# Patient Record
Sex: FEMALE | Race: WHITE | NOT HISPANIC OR LATINO | Employment: OTHER | URBAN - METROPOLITAN AREA
[De-identification: names, ages, dates, MRNs, and addresses within clinical notes are randomized per-mention and may not be internally consistent; named-entity substitution may affect disease eponyms.]

---

## 2017-02-24 LAB
ABO + RH BLD: NORMAL
HBV SURFACE AG SERPL QL IA: NORMAL
RUBELLA ANTIBODY IGG QUANTITATIVE: NORMAL IU/ML

## 2017-09-22 LAB — GROUP B STREP PCR: NORMAL

## 2017-10-10 ENCOUNTER — ANESTHESIA (OUTPATIENT)
Dept: OBGYN | Facility: CLINIC | Age: 32
End: 2017-10-10
Payer: COMMERCIAL

## 2017-10-10 ENCOUNTER — ANESTHESIA EVENT (OUTPATIENT)
Dept: OBGYN | Facility: CLINIC | Age: 32
End: 2017-10-10
Payer: COMMERCIAL

## 2017-10-10 ENCOUNTER — HOSPITAL ENCOUNTER (INPATIENT)
Facility: CLINIC | Age: 32
LOS: 2 days | Discharge: HOME | End: 2017-10-12
Attending: OBSTETRICS & GYNECOLOGY | Admitting: OBSTETRICS & GYNECOLOGY
Payer: COMMERCIAL

## 2017-10-10 PROBLEM — Z36.89 ENCOUNTER FOR TRIAGE IN PREGNANT PATIENT: Status: ACTIVE | Noted: 2017-10-10

## 2017-10-10 LAB
ABO + RH BLD: NORMAL
ABO + RH BLD: NORMAL
BLD GP AB SCN SERPL QL: NORMAL
BLOOD BANK CMNT PATIENT-IMP: NORMAL
SPECIMEN EXP DATE BLD: NORMAL

## 2017-10-10 PROCEDURE — 86780 TREPONEMA PALLIDUM: CPT | Performed by: OBSTETRICS & GYNECOLOGY

## 2017-10-10 PROCEDURE — 25000132 ZZH RX MED GY IP 250 OP 250 PS 637: Performed by: OBSTETRICS & GYNECOLOGY

## 2017-10-10 PROCEDURE — 86850 RBC ANTIBODY SCREEN: CPT | Performed by: OBSTETRICS & GYNECOLOGY

## 2017-10-10 PROCEDURE — 86901 BLOOD TYPING SEROLOGIC RH(D): CPT | Performed by: OBSTETRICS & GYNECOLOGY

## 2017-10-10 PROCEDURE — 3E0R3BZ INTRODUCTION OF ANESTHETIC AGENT INTO SPINAL CANAL, PERCUTANEOUS APPROACH: ICD-10-PCS | Performed by: ANESTHESIOLOGY

## 2017-10-10 PROCEDURE — 25000125 ZZHC RX 250: Performed by: OBSTETRICS & GYNECOLOGY

## 2017-10-10 PROCEDURE — 25000128 H RX IP 250 OP 636: Performed by: ANESTHESIOLOGY

## 2017-10-10 PROCEDURE — 12000037 ZZH R&B POSTPARTUM INTERMEDIATE

## 2017-10-10 PROCEDURE — 99215 OFFICE O/P EST HI 40 MIN: CPT

## 2017-10-10 PROCEDURE — 25000125 ZZHC RX 250: Performed by: ANESTHESIOLOGY

## 2017-10-10 PROCEDURE — 10907ZC DRAINAGE OF AMNIOTIC FLUID, THERAPEUTIC FROM PRODUCTS OF CONCEPTION, VIA NATURAL OR ARTIFICIAL OPENING: ICD-10-PCS | Performed by: OBSTETRICS & GYNECOLOGY

## 2017-10-10 PROCEDURE — 25000128 H RX IP 250 OP 636: Performed by: OBSTETRICS & GYNECOLOGY

## 2017-10-10 PROCEDURE — 36415 COLL VENOUS BLD VENIPUNCTURE: CPT | Performed by: OBSTETRICS & GYNECOLOGY

## 2017-10-10 PROCEDURE — 37000011 ZZH ANESTHESIA WARD SERVICE

## 2017-10-10 PROCEDURE — S0020 INJECTION, BUPIVICAINE HYDRO: HCPCS | Performed by: ANESTHESIOLOGY

## 2017-10-10 PROCEDURE — 72200001 ZZH LABOR CARE VAGINAL DELIVERY SINGLE

## 2017-10-10 PROCEDURE — 86900 BLOOD TYPING SEROLOGIC ABO: CPT | Performed by: OBSTETRICS & GYNECOLOGY

## 2017-10-10 RX ORDER — EPHEDRINE SULFATE 50 MG/ML
5 INJECTION, SOLUTION INTRAMUSCULAR; INTRAVENOUS; SUBCUTANEOUS
Status: DISCONTINUED | OUTPATIENT
Start: 2017-10-10 | End: 2017-10-10

## 2017-10-10 RX ORDER — CARBOPROST TROMETHAMINE 250 UG/ML
250 INJECTION, SOLUTION INTRAMUSCULAR
Status: DISCONTINUED | OUTPATIENT
Start: 2017-10-10 | End: 2017-10-10

## 2017-10-10 RX ORDER — LIDOCAINE 40 MG/G
CREAM TOPICAL
Status: DISCONTINUED | OUTPATIENT
Start: 2017-10-10 | End: 2017-10-10

## 2017-10-10 RX ORDER — IBUPROFEN 400 MG/1
400-800 TABLET, FILM COATED ORAL EVERY 6 HOURS PRN
Status: DISCONTINUED | OUTPATIENT
Start: 2017-10-10 | End: 2017-10-12 | Stop reason: HOSPADM

## 2017-10-10 RX ORDER — OXYTOCIN 10 [USP'U]/ML
10 INJECTION, SOLUTION INTRAMUSCULAR; INTRAVENOUS
Status: DISCONTINUED | OUTPATIENT
Start: 2017-10-10 | End: 2017-10-10

## 2017-10-10 RX ORDER — NALOXONE HYDROCHLORIDE 0.4 MG/ML
.1-.4 INJECTION, SOLUTION INTRAMUSCULAR; INTRAVENOUS; SUBCUTANEOUS
Status: DISCONTINUED | OUTPATIENT
Start: 2017-10-10 | End: 2017-10-12 | Stop reason: HOSPADM

## 2017-10-10 RX ORDER — NALBUPHINE HYDROCHLORIDE 10 MG/ML
2.5-5 INJECTION, SOLUTION INTRAMUSCULAR; INTRAVENOUS; SUBCUTANEOUS EVERY 6 HOURS PRN
Status: DISCONTINUED | OUTPATIENT
Start: 2017-10-10 | End: 2017-10-10

## 2017-10-10 RX ORDER — OXYTOCIN 10 [USP'U]/ML
10 INJECTION, SOLUTION INTRAMUSCULAR; INTRAVENOUS
Status: DISCONTINUED | OUTPATIENT
Start: 2017-10-10 | End: 2017-10-12 | Stop reason: HOSPADM

## 2017-10-10 RX ORDER — FENTANYL CITRATE 50 UG/ML
25 INJECTION, SOLUTION INTRAMUSCULAR; INTRAVENOUS ONCE
Status: COMPLETED | OUTPATIENT
Start: 2017-10-10 | End: 2017-10-10

## 2017-10-10 RX ORDER — AMOXICILLIN 250 MG
1-2 CAPSULE ORAL 2 TIMES DAILY
Status: DISCONTINUED | OUTPATIENT
Start: 2017-10-10 | End: 2017-10-12 | Stop reason: HOSPADM

## 2017-10-10 RX ORDER — SODIUM CHLORIDE, SODIUM LACTATE, POTASSIUM CHLORIDE, CALCIUM CHLORIDE 600; 310; 30; 20 MG/100ML; MG/100ML; MG/100ML; MG/100ML
INJECTION, SOLUTION INTRAVENOUS CONTINUOUS
Status: DISCONTINUED | OUTPATIENT
Start: 2017-10-10 | End: 2017-10-10

## 2017-10-10 RX ORDER — OXYCODONE AND ACETAMINOPHEN 5; 325 MG/1; MG/1
1 TABLET ORAL
Status: DISCONTINUED | OUTPATIENT
Start: 2017-10-10 | End: 2017-10-10

## 2017-10-10 RX ORDER — HYDROMORPHONE HYDROCHLORIDE 1 MG/ML
.3-.5 INJECTION, SOLUTION INTRAMUSCULAR; INTRAVENOUS; SUBCUTANEOUS EVERY 30 MIN PRN
Status: DISCONTINUED | OUTPATIENT
Start: 2017-10-10 | End: 2017-10-12 | Stop reason: HOSPADM

## 2017-10-10 RX ORDER — BUPIVACAINE HYDROCHLORIDE 2.5 MG/ML
1.5 INJECTION, SOLUTION EPIDURAL; INFILTRATION; INTRACAUDAL ONCE
Status: COMPLETED | OUTPATIENT
Start: 2017-10-10 | End: 2017-10-10

## 2017-10-10 RX ORDER — IBUPROFEN 400 MG/1
800 TABLET, FILM COATED ORAL
Status: DISCONTINUED | OUTPATIENT
Start: 2017-10-10 | End: 2017-10-10

## 2017-10-10 RX ORDER — OXYTOCIN/0.9 % SODIUM CHLORIDE 30/500 ML
340 PLASTIC BAG, INJECTION (ML) INTRAVENOUS CONTINUOUS PRN
Status: DISCONTINUED | OUTPATIENT
Start: 2017-10-10 | End: 2017-10-12 | Stop reason: HOSPADM

## 2017-10-10 RX ORDER — BISACODYL 10 MG
10 SUPPOSITORY, RECTAL RECTAL DAILY PRN
Status: DISCONTINUED | OUTPATIENT
Start: 2017-10-12 | End: 2017-10-12 | Stop reason: HOSPADM

## 2017-10-10 RX ORDER — OXYCODONE HYDROCHLORIDE 5 MG/1
5-10 TABLET ORAL
Status: DISCONTINUED | OUTPATIENT
Start: 2017-10-10 | End: 2017-10-12 | Stop reason: HOSPADM

## 2017-10-10 RX ORDER — ACETAMINOPHEN 325 MG/1
650 TABLET ORAL EVERY 4 HOURS PRN
Status: DISCONTINUED | OUTPATIENT
Start: 2017-10-10 | End: 2017-10-10

## 2017-10-10 RX ORDER — MISOPROSTOL 200 UG/1
400 TABLET ORAL
Status: DISCONTINUED | OUTPATIENT
Start: 2017-10-10 | End: 2017-10-12 | Stop reason: HOSPADM

## 2017-10-10 RX ORDER — NALOXONE HYDROCHLORIDE 0.4 MG/ML
.1-.4 INJECTION, SOLUTION INTRAMUSCULAR; INTRAVENOUS; SUBCUTANEOUS
Status: DISCONTINUED | OUTPATIENT
Start: 2017-10-10 | End: 2017-10-10

## 2017-10-10 RX ORDER — ONDANSETRON 2 MG/ML
4 INJECTION INTRAMUSCULAR; INTRAVENOUS EVERY 6 HOURS PRN
Status: DISCONTINUED | OUTPATIENT
Start: 2017-10-10 | End: 2017-10-10

## 2017-10-10 RX ORDER — HYDROCORTISONE 2.5 %
CREAM (GRAM) TOPICAL 3 TIMES DAILY PRN
Status: DISCONTINUED | OUTPATIENT
Start: 2017-10-10 | End: 2017-10-12 | Stop reason: HOSPADM

## 2017-10-10 RX ORDER — METHYLERGONOVINE MALEATE 0.2 MG/ML
200 INJECTION INTRAVENOUS
Status: DISCONTINUED | OUTPATIENT
Start: 2017-10-10 | End: 2017-10-10

## 2017-10-10 RX ORDER — METHYLERGONOVINE MALEATE 0.2 MG/ML
200 INJECTION INTRAVENOUS
Status: DISCONTINUED | OUTPATIENT
Start: 2017-10-10 | End: 2017-10-12 | Stop reason: HOSPADM

## 2017-10-10 RX ORDER — OXYTOCIN/0.9 % SODIUM CHLORIDE 30/500 ML
100-340 PLASTIC BAG, INJECTION (ML) INTRAVENOUS CONTINUOUS PRN
Status: DISCONTINUED | OUTPATIENT
Start: 2017-10-10 | End: 2017-10-11

## 2017-10-10 RX ORDER — OXYTOCIN/0.9 % SODIUM CHLORIDE 30/500 ML
100 PLASTIC BAG, INJECTION (ML) INTRAVENOUS CONTINUOUS
Status: DISCONTINUED | OUTPATIENT
Start: 2017-10-10 | End: 2017-10-12 | Stop reason: HOSPADM

## 2017-10-10 RX ORDER — ACETAMINOPHEN 325 MG/1
650 TABLET ORAL EVERY 4 HOURS PRN
Status: DISCONTINUED | OUTPATIENT
Start: 2017-10-10 | End: 2017-10-12 | Stop reason: HOSPADM

## 2017-10-10 RX ORDER — LANOLIN 100 %
OINTMENT (GRAM) TOPICAL
Status: DISCONTINUED | OUTPATIENT
Start: 2017-10-10 | End: 2017-10-12 | Stop reason: HOSPADM

## 2017-10-10 RX ADMIN — IBUPROFEN 800 MG: 400 TABLET ORAL at 21:56

## 2017-10-10 RX ADMIN — SODIUM CHLORIDE, POTASSIUM CHLORIDE, SODIUM LACTATE AND CALCIUM CHLORIDE 1000 ML: 600; 310; 30; 20 INJECTION, SOLUTION INTRAVENOUS at 19:03

## 2017-10-10 RX ADMIN — BUPIVACAINE HYDROCHLORIDE 1.5 ML: 2.5 INJECTION, SOLUTION EPIDURAL; INFILTRATION; INTRACAUDAL at 19:33

## 2017-10-10 RX ADMIN — ACETAMINOPHEN 650 MG: 325 TABLET, FILM COATED ORAL at 21:56

## 2017-10-10 RX ADMIN — Medication 340 ML/HR: at 20:51

## 2017-10-10 RX ADMIN — FENTANYL CITRATE 25 MCG: 50 INJECTION, SOLUTION INTRAMUSCULAR; INTRAVENOUS at 19:33

## 2017-10-10 NOTE — IP AVS SNAPSHOT
MRN:[id number]                      After Visit Summary   10/10/2017    Maya Dave    MRN: [id number]           Thank you!     Thank you for choosing [facility] for your care. Our goal is always to provide you with excellent care. Hearing back from our patients is one way we can continue to improve our services. Please take a few minutes to complete the written survey that you may receive in the mail after you visit with us. Thank you!        Patient Information     Date Of Birth          1985        About your hospital stay     You were admitted on:  October 10, 2017 You last received care in the:  [address]    You were discharged on:  October 12, 2017        Reason for your hospital stay       Maternity care                  Who to Call     For medical emergencies, please call 911.  For non-urgent questions about your medical care, please call your primary care provider or clinic, None          Attending Provider     Provider Specialty    Pascual Hdez MD OB/Gyn    Nola Villegas,  OB/Gyn       Primary Care Provider    None Specified      After Care Instructions     Activity       Review discharge instructions            Diet       Resume previous diet            Discharge Instructions - Gestational diabetic patients       Gestational diabetic patients to follow up for fasting blood sugar and 2 hour 75gm glucose load at 6 weeks postpartum.            Discharge Instructions - Hypertensive disorders patients       High Blood pressure patients to follow up in clinic or via home care within one week for a blood pressure check            Discharge Instructions - Postpartum visit       Schedule postpartum visit with your provider and return to clinic in 6 weeks.                  Further instructions from your care team       Postpartum Vaginal Delivery Instructions    Activity       Ask family and friends for help when you need it.    Do not  place anything in your vagina for 6 weeks.    You are not restricted on other activities, but take it easy for a few weeks to allow your body to recover from delivery.  You are able to do any activities you feel up to that point.    No driving until you have stopped taking your pain medications (usually two weeks after delivery).     Call your health care provider if you have any of these symptoms:       Increased pain, swelling, redness, or fluid around your stiches from an episiotomy or perineal tear.    A fever above 100.4 F (38 C) with or without chills when placing a thermometer under your tongue.    You soak a sanitary pad with blood within 1 hour, or you see blood clots larger than a golf ball.    Bleeding that lasts more than 6 weeks.    Vaginal discharge that smells bad.    Severe pain, cramping or tenderness in your lower belly area.    A need to urinate more frequently (use the toilet more often), more urgently (use the toilet very quickly), or it burns when you urinate.    Nausea and vomiting.    Redness, swelling or pain around a vein in your leg.    Problems breastfeeding or a red or painful area on your breast.    Chest pain and cough or are gasping for air.    Problems coping with sadness, anxiety, or depression.  If you have any concerns about hurting yourself or the baby, call your provider immediately.     You have questions or concerns after you return home.     Keep your hands clean:  Always wash your hands before touching your perineal area and stitches.  This helps reduce your risk of infection.  If your hands aren't dirty, you may use an alcohol hand-rub to clean your hands. Keep your nails clean and short.        Pending Results     No orders found from 10/8/2017 to 10/11/2017.            Statement of Approval     Ordered          10/11/17 1406  I have reviewed and agree with all the recommendations and orders detailed in this document.  EFFECTIVE NOW     Approved and electronically signed  "by:  Nola Villegas DO             Admission Information     Date & Time Provider Department Dept. Phone    10/10/2017 Nola Villegas DO [address] [phone]      Your Vitals Were     Blood Pressure Pulse Temperature Respirations Height Weight    126/65 57 98.5  F (36.9  C) (Oral) 16 1.702 m (5' 7\") 71.2 kg (157 lb)    Pulse Oximetry BMI (Body Mass Index)                99% 24.59 kg/m2          [employer] Information     [employer] lets you send messages to your doctor, view your test results, renew your prescriptions, schedule appointments and more. To sign up, go to [email or web address] . Click on \"Log in\" on the left side of the screen, which will take you to the Welcome page. Then click on \"Sign up Now\" on the right side of the page.     You will be asked to enter the access code listed below, as well as some personal information. Please follow the directions to create your username and password.     Your access code is: [email or web address]  Expires: 1/10/2018  8:33 AM     Your access code will  in 90 days. If you need help or a new code, please call your [facility] clinic or [phone].        Care EveryWhere ID     This is your Care EveryWhere ID. This could be used by other organizations to access your [facility] medical records  [id number]        Equal Access to Services     [facility] campusJUAN AH: Hadii cruzito ho hadasho Soomaali, waaxda luqadaha, qaybta kaalmada adeegyada, olena summers . So [facility] [phone].    ATENCIÓN: Si habla español, tiene a maddox disposición servicios gratuitos de asistencia lingüística. Llame al [phone].    We comply with applicable federal civil rights laws and [place] laws. We do not discriminate on the basis of race, color, national origin, age, disability, sex, sexual orientation, or gender identity.               Review of your medicines      UNREVIEWED medicines. Ask your doctor about these medicines        " Dose / Directions    magnesium chloride 535 (64 MG) MG Tbcr CR tablet        Dose:  535 mg   Take 535 mg by mouth daily   Refills:  0       PRENATAL VITAMIN PO        Dose:  1 tablet   Take 1 tablet by mouth daily.   Refills:  0                Protect others around you: Learn how to safely use, store and throw away your medicines at www.disposemymeds.org.             Medication List: This is a list of all your medications and when to take them. Check marks below indicate your daily home schedule. Keep this list as a reference.      Medications           Morning Afternoon Evening Bedtime As Needed    magnesium chloride 535 (64 MG) MG Tbcr CR tablet   Take 535 mg by mouth daily                                PRENATAL VITAMIN PO   Take 1 tablet by mouth daily.

## 2017-10-10 NOTE — IP AVS SNAPSHOT
[address]    [place] [address]    Phone:  [phone]                                       After Visit Summary   10/10/2017    Maya Dave    MRN: [id number]           After Visit Summary Signature Page     I have received my discharge instructions, and my questions have been answered. I have discussed any challenges I see with this plan with the nurse or doctor.    ..........................................................................................................................................  Patient/Patient Representative Signature      ..........................................................................................................................................  Patient Representative Print Name and Relationship to Patient    ..................................................               ................................................  Date                                            Time    ..........................................................................................................................................  Reviewed by Signature/Title    ...................................................              ..............................................  Date                                                            Time

## 2017-10-11 LAB — T PALLIDUM IGG+IGM SER QL: NEGATIVE

## 2017-10-11 PROCEDURE — 25000132 ZZH RX MED GY IP 250 OP 250 PS 637: Performed by: OBSTETRICS & GYNECOLOGY

## 2017-10-11 PROCEDURE — 12000037 ZZH R&B POSTPARTUM INTERMEDIATE

## 2017-10-11 RX ADMIN — SENNOSIDES AND DOCUSATE SODIUM 1 TABLET: 8.6; 5 TABLET ORAL at 20:14

## 2017-10-11 RX ADMIN — SENNOSIDES AND DOCUSATE SODIUM 1 TABLET: 8.6; 5 TABLET ORAL at 12:44

## 2017-10-11 RX ADMIN — IBUPROFEN 800 MG: 400 TABLET ORAL at 09:11

## 2017-10-11 RX ADMIN — ACETAMINOPHEN 650 MG: 325 TABLET, FILM COATED ORAL at 12:44

## 2017-10-11 RX ADMIN — ACETAMINOPHEN 650 MG: 325 TABLET, FILM COATED ORAL at 05:49

## 2017-10-11 RX ADMIN — IBUPROFEN 800 MG: 400 TABLET ORAL at 15:24

## 2017-10-11 RX ADMIN — ACETAMINOPHEN 650 MG: 325 TABLET, FILM COATED ORAL at 01:25

## 2017-10-11 RX ADMIN — IBUPROFEN 800 MG: 400 TABLET ORAL at 03:27

## 2017-10-11 RX ADMIN — ACETAMINOPHEN 650 MG: 325 TABLET, FILM COATED ORAL at 20:14

## 2017-10-11 RX ADMIN — ACETAMINOPHEN 650 MG: 325 TABLET, FILM COATED ORAL at 09:11

## 2017-10-11 NOTE — ANESTHESIA PREPROCEDURE EVALUATION
Anesthesia Evaluation     . Pt has had prior anesthetic.     No history of anesthetic complications          ROS/MED HX    ENT/Pulmonary:       Neurologic:       Cardiovascular:         METS/Exercise Tolerance:     Hematologic:         Musculoskeletal:         GI/Hepatic:         Renal/Genitourinary:         Endo:         Psychiatric:         Infectious Disease:         Malignancy:         Other:                                    Anesthesia Plan      History & Physical Review  History and physical reviewed and following examination; no interval change.    ASA Status:  2 .        Plan for Spinal     Healthy multip, 3rd delivery, 7cm with history of fast labor.  Requesting ITN      Postoperative Care      Consents  Plan/risks discussed with: family declined risks.                        .

## 2017-10-11 NOTE — PLAN OF CARE
Data: Maya Dave transferred to [facility] via wheelchair at 2310. Baby transferred via parent's arms.  Action: Receiving unit notified of transfer: Yes. Patient and family notified of room change. Report given to Du at 2320. Belongings sent to receiving unit. Accompanied by Registered Nurse. Oriented patient to surroundings. Call light within reach. ID bands double-checked with receiving RN.  Response: Patient tolerated transfer and is stable.

## 2017-10-11 NOTE — H&P
No significant change in general health status based on examination of the patient, review of Nursing Admission Database and prenatal record.    Madonna Craig

## 2017-10-11 NOTE — L&D DELIVERY NOTE
DELIVERY SUMMARY:  Date: 10/10/2017     HISTORY:  Maya Dave is a 32 year old  at 38w6d gestation. Prenatal course uncomplicated. GBS negative.  She is Rh positive and Rubella immune.      FIRST STAGE:  She presented to labor and delivery with active labor.  Amniotic fluid was noted to be clear at the time of amniotomy.  She progressed to complete.  ITN analgesia.    SECOND STAGE:   Fetal heart tones were reassuring during the second stage of labor.  Head delivered OA over intact perineum.  No nuchal cord. Shoulders were delivered without difficulty and the remainder of the body followed.  The male infant was placed directly on the maternal abdomen and the infant was bulb suctioned.  Cord clamping was delayed 60 seconds after which the cord was clamped and cut.  Cord blood was obtained.  IV Pitocin was given through running IV.  Apgar 8 at 1 minute and 9 at 5 minutes.  Weight pending.    THIRD STAGE:  Pitocin was administered after delivery of the infant.  The placenta delivered spontaneously with gentle cord traction.  No lacerations. The uterus was noted to be firm.  Sponge and needle counts were correct.  Quantitated blood loss was 325 cc.  Mom and baby doing well and stable to transfer to postpartum recovery.    Madonna Craig  Obstetrics, Gynecology, and Infertility

## 2017-10-11 NOTE — PLAN OF CARE
-writer assumes care of patient   -MDA at bedside for ITN, patient sitting for procedure. Bupivacaine and fentanyl handed off  193-LT  9020-7231 PD down to 80s that returned to baseline after AROM and repositioning  -IVF bolus  0O2 placed 15L  -Dr. Craig at bedside  -SVE 6/80 AROM clear fluid  -RL  -patient to LL SVE 9  -SVE 10. Patient set up to start pushing  -Patient started pushing. RN and MD at bedside continously for pushing to monitor fht and patient progress.   - male apgars 8/9  -Placenta delivered intact

## 2017-10-11 NOTE — ANESTHESIA PROCEDURE NOTES
Peripheral nerve/Neuraxial procedure note : intrathecal  Pre-Procedure  Performed by ANTHONY CARR  Location: OB      Pre-Anesthestic Checklist: patient identified, IV checked, risks and benefits discussed, informed consent, pre-op evaluation and at physician/surgeon's request    Timeout  Correct Patient: Yes   Correct Procedure: Yes       Correct Position: Yes     .   Procedure Documentation    .    Procedure:    Intrathecal.  Insertion Site:L3-4  (midline approach)      Patient Prep;mask, sterile gloves, povidone-iodine 7.5% surgical scrub, patient draped.  .  Needle: Spinal Needle (gauge): 24 Spinal/LP Needle Length (inches): 3.5 # of attempts: 1 and # of redirects:  Introducer used .       Assessment/Narrative  Paresthesias: No.  .  .  clear CSF fluid removed . Comments:  Christelle-Kilo needle

## 2017-10-11 NOTE — PLAN OF CARE
Problem: Patient Care Overview  Goal: Plan of Care/Patient Progress Review  Outcome: Improving  Data: Vital signs within normal limits. Postpartum checks within normal limits - see flow record. Patient eating and drinking normally. Patient able to empty bladder independently and is up ambulating. No apparent signs of infection. Patient performing self cares and is able to care for infant.  Action: Patient medicated during the shift for cramping. See MAR. Patient reassessed within 1 hour after each medication and pain was improved - patient stated she was comfortable. Patient education done about first 24 hour cares. See flow record.  Response: Positive attachment behaviors observed with infant. Support persons are present.   Plan: Anticipate discharge on 10/12/14.

## 2017-10-11 NOTE — LACTATION NOTE
Initial Lactation visit. Hand out given. Recommend unlimited, frequent breast feedings: At least 8 - 12 times every 24 hours. Avoid pacifiers and supplementation with formula unless medically indicated. Explained benefits of holding baby skin on skin to help promote better breastfeeding outcomes. Will revisit as needed.    Citlali Kim RN IBCLC

## 2017-10-11 NOTE — PLAN OF CARE
Patient presents to Maternal assessment center with complaints of contractions which began at 1630 and became increasingly painful.  Has a history of fast labors with her other two babies.  Is grimacing and having trouble controlling breathing through contractions.    1820:  Monitors applied with patient's consent.  She states her cervix was 3 cms dilated in the office yesterday.  Positive fetal movement, denies bleeding or leaking of fluid.  Vital signs obtained and history taken.      Cervical exam with patient's consent at 1830.  4.5 cms dilated, 90 percent effacement noted and -1 station.  Patient reports that she was examined yesterday and that she was vertex.      Call placed to Dr. Hdez, on call for Dr. Villegas.  He is in surgery at [facility] and Dr. Craig will be backup until he is available.  1847:  Patient transferred to room 220

## 2017-10-11 NOTE — PLAN OF CARE
Pt. admitted from L&D  via wheelchair and transferred to bed with standby assist. Pt. arrived with baby and was accompanied by spouse and RN and arrived with personal belongings. Report was taken from Mary Davis RN in L&D. VSS. Fundus is firm and midline.  Vaginal bleeding is small.  Pitocin infusing per policy.  Pt. oriented to the room and call light system.

## 2017-10-11 NOTE — PLAN OF CARE
Problem: Patient Care Overview  Goal: Plan of Care/Patient Progress Review  Outcome: Improving  VSS. Pain well controlled with tylenol, ibuprofen, and heating pad. Up independently in room. Independent with breastfeeding  and  cares. On pathway. Continue to monitor and notify MD as needed.

## 2017-10-11 NOTE — PROGRESS NOTES
"[facility] Obstetrics Postpartum Progress Note  10/11/2017     S: Pt doing well. Pain is well controlled. Bleeding Moderate. Infant is being . Voiding spontaneously.    O:  /65  Pulse 75  Temp 98  F (36.7  C) (Oral)  Resp 16  Ht 1.702 m (5' 7\")  Wt 71.2 kg (157 lb)  SpO2 99%  Breastfeeding? Unknown  BMI 24.59 kg/m2   Gen: healthy, alert and no distress    Resp: nonlabored breathing  Abd: soft, nondistended, appropriately TTP, FF at u  Ext: non-tender, no edema    No results found for: HGB  Lab Results   Component Value Date    ABO O 10/10/2017    RH Pos 10/10/2017    AS Neg 10/10/2017    RUBELLAABIGG 52 2012       A: 32 year old  PPD#1 s/p    P:   Routine postpartum cares.    Ambulation encouraged  Breast feeding strategies discussed  Monitor wound for signs of infection  Pain control measures as needed  Reportable signs and symptoms dicussed with the patient  Anticipate discharge tomorrow      Nola Villegas   Obstetrics, Gynecology, and Infertility        "

## 2017-10-12 VITALS
OXYGEN SATURATION: 99 % | HEIGHT: 67 IN | RESPIRATION RATE: 16 BRPM | SYSTOLIC BLOOD PRESSURE: 126 MMHG | HEART RATE: 57 BPM | BODY MASS INDEX: 24.64 KG/M2 | DIASTOLIC BLOOD PRESSURE: 65 MMHG | TEMPERATURE: 98.5 F | WEIGHT: 157 LBS

## 2017-10-12 PROCEDURE — 25000132 ZZH RX MED GY IP 250 OP 250 PS 637: Performed by: OBSTETRICS & GYNECOLOGY

## 2017-10-12 RX ADMIN — ACETAMINOPHEN 650 MG: 325 TABLET, FILM COATED ORAL at 00:00

## 2017-10-12 RX ADMIN — SENNOSIDES AND DOCUSATE SODIUM 2 TABLET: 8.6; 5 TABLET ORAL at 08:12

## 2017-10-12 RX ADMIN — IBUPROFEN 800 MG: 400 TABLET ORAL at 06:29

## 2017-10-12 RX ADMIN — ACETAMINOPHEN 650 MG: 325 TABLET, FILM COATED ORAL at 06:29

## 2017-10-12 RX ADMIN — IBUPROFEN 800 MG: 400 TABLET ORAL at 00:01

## 2017-10-12 NOTE — PLAN OF CARE
Problem: Patient Care Overview  Goal: Plan of Care/Patient Progress Review  Outcome: Adequate for Discharge Date Met:  10/12/17  Vitals stable. Up ad ramon well. Voiding without difficulty. Breastfeeding going well. Pain controlled with tylenol and ibuprofen. Anticipate discharge home today with infant.

## 2017-10-12 NOTE — PLAN OF CARE
Mom and baby discharge instructions discussed with pt and spouse who verbalize understanding. Pt states she does not need breast pump. Mom and baby ID bands matched. Security alarms removed. Mom and baby discharge to home.

## 2017-10-12 NOTE — PLAN OF CARE
Problem: Patient Care Overview  Goal: Plan of Care/Patient Progress Review  Outcome: Improving  Vital signs are stable. Postpartum checks within normal limits.  Patient eating and drinking normally. Patient able to empty bladder independently and is up ambulating. No apparent signs of infection. Patient performing self cares and is able to care for infant. Positive attachment behaviors observed with infant. Breastfeeding is going well.  Support persons are present. D/C order has been put in for tomorrow.  Will continue to monitor.

## 2017-10-12 NOTE — PLAN OF CARE
Problem: Patient Care Overview  Goal: Plan of Care/Patient Progress Review  Outcome: Improving  Vital signs stable. Patient voiding without difficulty. Able to ambulate in room free of dizziness. Taking tylenol/ibuprofen for pain management. Working on breastfeeding infant every 2-3 hours. Encouraged to call with questions/concerns. Will continue to monitor.

## 2017-10-12 NOTE — ANESTHESIA POSTPROCEDURE EVALUATION
Patient: Maya Dave    * No procedures listed *    Diagnosis:* No pre-op diagnosis entered *  Diagnosis Additional Information: No value filed.    Anesthesia Type:  Spinal    Note:  Anesthesia Post Evaluation    Patient location during evaluation: Floor  Patient participation: Able to fully participate in evaluation  Level of consciousness: awake  Pain management: adequate  Airway patency: patent  Cardiovascular status: acceptable  Respiratory status: acceptable  Hydration status: acceptable  PONV: none     Anesthetic complications: None    Comments: ITN no complications        Last vitals:  Vitals:    10/11/17 0545 10/11/17 0800 10/11/17 1532   BP: 119/43 116/65 104/61   Pulse:   59   Resp: 16 16 16   Temp: 36.6  C (97.8  F) 36.7  C (98  F) 36.9  C (98.4  F)   SpO2:            Electronically Signed By: Isiah Olivera MD  October 11, 2017  9:12 PM

## 2019-02-21 ENCOUNTER — TRANSFERRED RECORDS (OUTPATIENT)
Dept: HEALTH INFORMATION MANAGEMENT | Facility: CLINIC | Age: 34
End: 2019-02-21

## 2019-02-26 DIAGNOSIS — O21.1 HYPEREMESIS GRAVIDARUM WITH DEHYDRATION: ICD-10-CM

## 2019-02-26 RX ORDER — DEXTROSE, SODIUM CHLORIDE, SODIUM LACTATE, POTASSIUM CHLORIDE, AND CALCIUM CHLORIDE 5; .6; .31; .03; .02 G/100ML; G/100ML; G/100ML; G/100ML; G/100ML
INJECTION, SOLUTION INTRAVENOUS CONTINUOUS
Status: CANCELLED
Start: 2019-02-27

## 2019-02-26 RX ORDER — ONDANSETRON 2 MG/ML
4 INJECTION INTRAMUSCULAR; INTRAVENOUS ONCE
Status: CANCELLED
Start: 2019-02-27 | End: 2019-02-27

## 2019-02-27 ENCOUNTER — INFUSION THERAPY VISIT (OUTPATIENT)
Dept: INFUSION THERAPY | Facility: CLINIC | Age: 34
End: 2019-02-27
Attending: OBSTETRICS & GYNECOLOGY
Payer: COMMERCIAL

## 2019-02-27 VITALS — SYSTOLIC BLOOD PRESSURE: 100 MMHG | DIASTOLIC BLOOD PRESSURE: 67 MMHG | HEART RATE: 87 BPM

## 2019-02-27 DIAGNOSIS — O21.1 HYPEREMESIS GRAVIDARUM WITH DEHYDRATION: Primary | ICD-10-CM

## 2019-02-27 PROCEDURE — 96360 HYDRATION IV INFUSION INIT: CPT

## 2019-02-27 PROCEDURE — 25000128 H RX IP 250 OP 636: Performed by: OBSTETRICS & GYNECOLOGY

## 2019-02-27 PROCEDURE — 25800030 ZZH RX IP 258 OP 636: Performed by: OBSTETRICS & GYNECOLOGY

## 2019-02-27 PROCEDURE — 96361 HYDRATE IV INFUSION ADD-ON: CPT

## 2019-02-27 RX ORDER — DEXTROSE, SODIUM CHLORIDE, SODIUM LACTATE, POTASSIUM CHLORIDE, AND CALCIUM CHLORIDE 5; .6; .31; .03; .02 G/100ML; G/100ML; G/100ML; G/100ML; G/100ML
INJECTION, SOLUTION INTRAVENOUS CONTINUOUS
Status: CANCELLED
Start: 2019-02-27

## 2019-02-27 RX ORDER — DEXTROSE, SODIUM CHLORIDE, SODIUM LACTATE, POTASSIUM CHLORIDE, AND CALCIUM CHLORIDE 5; .6; .31; .03; .02 G/100ML; G/100ML; G/100ML; G/100ML; G/100ML
INJECTION, SOLUTION INTRAVENOUS CONTINUOUS
Status: DISCONTINUED | OUTPATIENT
Start: 2019-02-27 | End: 2019-02-27 | Stop reason: HOSPADM

## 2019-02-27 RX ORDER — ONDANSETRON 2 MG/ML
4 INJECTION INTRAMUSCULAR; INTRAVENOUS ONCE
Status: CANCELLED
Start: 2019-02-27 | End: 2019-02-27

## 2019-02-27 RX ADMIN — SODIUM CHLORIDE, SODIUM LACTATE, POTASSIUM CHLORIDE, CALCIUM CHLORIDE AND DEXTROSE MONOHYDRATE: 5; 600; 310; 30; 20 INJECTION, SOLUTION INTRAVENOUS at 10:08

## 2019-02-27 RX ADMIN — SODIUM CHLORIDE, POTASSIUM CHLORIDE, SODIUM LACTATE AND CALCIUM CHLORIDE 1000 ML: 600; 310; 30; 20 INJECTION, SOLUTION INTRAVENOUS at 11:08

## 2019-02-27 NOTE — PROGRESS NOTES
"Infusion Nursing Note:  Maya Dave presents today for IVF.    Patient seen by provider today: No   present during visit today: Not Applicable.    Note: Pt requests to not receive Zofran today.  She thinks she had this in the past and it causes \"skin crawling sensations and anxiousness.\"  Will only receive IVF today.  Has f/u with her OB tomorrow and will discuss other anti-emetic options for infusion and at home. Currently uses B6 and Unisom, which she doesn't feel helps with her nausea during the day but it does help her to sleep at night.    Intravenous Access:  Peripheral IV placed.    Treatment Conditions:  Not Applicable.      Post Infusion Assessment:  Patient tolerated infusion without incident.  Site patent and intact, free from redness, edema or discomfort.  No evidence of extravasations.  Access discontinued per protocol.    Discharge Plan:   Discharge instructions reviewed with: Patient.  Patient and/or family verbalized understanding of discharge instructions and all questions answered.  Patient will return PRN for next appointment.   Patient discharged in stable condition accompanied by: self.  Departure Mode: Ambulatory.    Nay Morgan RN                        "

## 2019-03-20 LAB
ABO + RH BLD: NORMAL
ABO + RH BLD: NORMAL
BLD GP AB SCN SERPL QL: NORMAL
HBV SURFACE AG SERPL QL IA: NORMAL
HIV 1+2 AB+HIV1 P24 AG SERPL QL IA: NORMAL
RUBELLA ANTIBODY IGG QUANTITATIVE: NORMAL IU/ML
TREPONEMA ANTIBODIES: NON REACTIVE

## 2019-03-22 ENCOUNTER — PRE VISIT (OUTPATIENT)
Dept: MATERNAL FETAL MEDICINE | Facility: CLINIC | Age: 34
End: 2019-03-22

## 2019-04-10 RX ORDER — DEXTROSE, SODIUM CHLORIDE, SODIUM LACTATE, POTASSIUM CHLORIDE, AND CALCIUM CHLORIDE 5; .6; .31; .03; .02 G/100ML; G/100ML; G/100ML; G/100ML; G/100ML
INJECTION, SOLUTION INTRAVENOUS ONCE
Status: CANCELLED
Start: 2019-04-10

## 2019-05-01 ENCOUNTER — OFFICE VISIT (OUTPATIENT)
Dept: MATERNAL FETAL MEDICINE | Facility: CLINIC | Age: 34
End: 2019-05-01
Attending: OBSTETRICS & GYNECOLOGY
Payer: COMMERCIAL

## 2019-05-01 ENCOUNTER — HOSPITAL ENCOUNTER (OUTPATIENT)
Dept: LAB | Facility: CLINIC | Age: 34
Discharge: HOME | End: 2019-05-01
Attending: OBSTETRICS & GYNECOLOGY | Admitting: OBSTETRICS & GYNECOLOGY
Payer: COMMERCIAL

## 2019-05-01 DIAGNOSIS — J95.89 ACUTE RESPIRATORY INSUFFICIENCY, POSTOPERATIVE: ICD-10-CM

## 2019-05-01 DIAGNOSIS — O09.522 SUPERVISION OF ELDERLY MULTIGRAVIDA IN SECOND TRIMESTER: Primary | ICD-10-CM

## 2019-05-01 DIAGNOSIS — O09.522 SUPERVISION OF ELDERLY MULTIGRAVIDA IN SECOND TRIMESTER: ICD-10-CM

## 2019-05-01 PROCEDURE — 40000791 ZZHCL STATISTIC VERIFI PRENATAL TRISOMY 21,18,13: Performed by: OBSTETRICS & GYNECOLOGY

## 2019-05-01 PROCEDURE — 96040 ZZH GENETIC COUNSELING, EACH 30 MINUTES: CPT | Mod: ZF | Performed by: GENETIC COUNSELOR, MS

## 2019-05-01 PROCEDURE — 36415 COLL VENOUS BLD VENIPUNCTURE: CPT | Performed by: OBSTETRICS & GYNECOLOGY

## 2019-05-01 NOTE — PROGRESS NOTES
[facility]  Genetic Counseling Consult    Patient: Maya Dave YOB: 1985   Date of Service: 19      Maya Dave was seen at [facility] [place] for genetic consultation to discuss the options for screening and testing for fetal chromosome abnormalities.  The indication for genetic counseling is advanced maternal age.        Impression/Plan:   1.  Maya had a blood draw for NIPT (Innatal test through [facility]).  Results are expected within 7-10 days, and will be available in [employer].  We will contact her to discuss the results, and a copy will be forwarded to the office of the referring OB provider. Maya Dave provided verbal permission for results to be left on her voicemail at [phone]. She requested the results do not include the sex unless the sex chromosome aneuploidy results are abnormal.    2.  Maternal serum AFP (single marker screen) is recommended after 15 weeks to screen for open neural tube defects. A quad screen should not be performed.    3.  An 18-20 week comprehensive ultrasound is standard of care for all women 35 or older at delivery.    Pregnancy History:   /Parity:    Age at Delivery: 34 year old  RORY: 10/17/2019, by Ultrasound  Gestational Age: 15w6d    No significant complications or exposures were reported in the current pregnancy.    Kasey is no longer taking prenatal vitamins because they make her nauseous. I encouraged her to discuss this with her primary provider who maybe able to offer an alternative such as gummy vitamins      Maya coulter pregnancy history is significant for three full term pregnancies without complications.    She does report experiencing migraines during pregnancy that typically last a couple of days    Medical History:   Maya coulter reported medical history is not expected to impact pregnancy management or risks to fetal development.        Family History:   A three-generation pedigree was obtained, and is scanned under the  Media  tab.   The following significant findings were reported by Maya:    The father of the pregnancy, Jeffrey, 35, is healthy    Kasey and Jeffrey have two daughters (5.5, 3.5 years of age) and one son (1.5 years of age). All are healthy      Kasey's mother  at 49 from a heart attack due to an aortic dissection. She reports that her mother was very healthy before the aneurysm. The remaining family history is negative for aortic dissection or any other related complications. About 20% of thoracic aortic aneurysms and dissections are due to a genetic condition called familial thoracic aortic aneurysm and dissection (ATTILA). The remaining 80% are most likely due to non-inherited factors such as injury or disease. With the remaining family history negative for aneurysms and dissections and no other symptoms related to these conditions (hypermobiligy, hernia, stoke, etc), an inherited factor is unlikely but possible. I encouraged Kasey to discuss this family history with her primary provider and monitor the health of her mother's siblings for more information.      Kasey has two maternal aunts both diagnosed with breast cancer in their mid 50s.   We discussed the family history of breast cancer briefly. Cancer most often occurs by chance, however some families seem to develop cancer more frequently than expected. Everyone has a risk to develop cancer, but individuals may be at an increased risk to develop cancer based on their family history. Genetic counseling is available for cancer syndromes. Cancer family history, even without genetic testing, can change cancer screening recommendations for family members and aid in insurance coverage for access to them as well. The most informative individuals to complete cancer genetic counseling and genetic testing are those with a personal history of cancer or those closely related to the  "affected individuals. This would be one of Kasey's maternal aunts.    If the family wants more information they can contact the [facility] Cancer Risk Management Program ([phone]). Physicians can also make referrals at [email or web address] or, if within the [place] system, through [facility] referral for \"Cancer Risk Mgmt/Cancer Genetic Counseling\"     Indications to consider the program include a personal or family history of:   Breast cancer before age 50   Multiple close relatives with breast cancer  Triple negative breast cancer at any age  Ovarian cancer at any age  Male breast cancer  Ashkenazi Adventism ancestry and breast, pancreatic, ovarian cancer, or prostate cancer with a Nidhi score of 7+ at any age    Otherwise, the reported family history is negative for multiple miscarriages, stillbirths, birth defects, mental retardation, known genetic conditions, and consanguinity.       Carrier Screening:   The patient reports that she and the father of the pregnancy have  ancestry:      Cystic fibrosis is an autosomal recessive genetic condition that occurs with increased frequency in individuals of  ancestry and carrier screening for this condition is available.  In addition,  screening in the [facility] includes cystic fibrosis.      Expanded carrier screening for mutations in a large panel of genes associated with autosomal recessive conditions including cystic fibrosis, spinal muscular atrophy, and others, is now available.      Carrier screening was not discussed today.       Risk Assessment for Chromosome Conditions:   We explained that the risk for fetal chromosome abnormalities increases with maternal age. We discussed specific features of common chromosome abnormalities, including Down syndrome, trisomy 13, trisomy 18, and sex chromosome trisomies.      At age 34 at midtrimester, the risk to have a baby with Down " syndrome is 1 in 274.     At age 34 at midtrimester, the risk to have a baby with any chromosome abnormality is 1 in 135.     Testing Options:   We discussed the following options:   Non-invasive Prenatal Testing (NIPT)    Maternal plasma cell-free DNA testing; first trimester ultrasound with nuchal translucency and nasal bone assessment is recommended, when appropriate    Screens for fetal trisomy 21, trisomy 13, trisomy 18, and sex chromosome aneuploidy    Cannot screen for open neural tube defects; maternal serum AFP after 15 weeks is recommended     Chorionic villus sampling (CVS)    Invasive procedure typically performed in the first trimester by which placental villi are obtained for the purpose of chromosome analysis and/or other prenatal genetic analysis    Diagnostic results; >99% sensitivity for fetal chromosome abnormalities    Cannot test for open neural tube defects; maternal serum AFP after 15 weeks is recommended     Genetic Amniocentesis    Invasive procedure typically performed in the second trimester by which amniotic fluid is obtained for the purpose of chromosome analysis and/or other prenatal genetic analysis    Diagnostic results; >99% sensitivity for fetal chromosome abnormalities    AFAFP measurement tests for open neural tube defects     Comprehensive (Level II) ultrasound: Detailed ultrasound performed between 18-22 weeks gestation to screen for major birth defects and  markers for  aneuploidy.    We reviewed the benefits and limitations of this testing.  Screening tests provide a risk assessment specific to the pregnancy for certain fetal chromosome abnormalities, but cannot definitively diagnose or exclude a fetal chromosome abnormality.  Follow-up genetic counseling and consideration of diagnostic testing is recommended with any abnormal screening result.     Diagnostic tests carry inherent risks- including risk of miscarriage- that require careful consideration.  These tests can detect  fetal chromosome abnormalities with greater than 99% certainty.  Results can be compromised by maternal cell contamination or mosaicism, and are limited by the resolution of cytogenetic G-banding technology.  There is no screening nor diagnostic test that can detect all forms of birth defects or mental disability.     It was a pleasure to be involved with Maya coulter care. Face-to-face time of the meeting was 45 minutes.    Padmini Winter MS, [facility]  Licensed Genetic Counselor   [facility]   Maternal Fetal Medicine Centers  [email or web address]   Office: [phone] MF: [phone]  Pager: [phone] Fax: [phone]

## 2019-05-06 ENCOUNTER — TELEPHONE (OUTPATIENT)
Dept: MATERNAL FETAL MEDICINE | Facility: CLINIC | Age: 34
End: 2019-05-06

## 2019-05-06 NOTE — TELEPHONE ENCOUNTER
Called and discussed normal NIPT results with Maya. Results indicate NO ANEUPLOIDY DETECTED for chromosomes 21, 18, 13, or sex chromosomes (XX) Sex was NOT disclosed per patient's wishes.     This puts her current pregnancy at low risk for Down syndrome, trisomy 18, trisomy 13 and sex chromosome abnormalities. This test is reported to have the following sensitivities: Down syndrome- 99%, trisomy 18- 98%, and trisomy 13- 98%. Although these results are reassuring, this does not replace a standard chromosome analysis from a chorionic villus sampling or amniocentesis.     Level II ultrasound is recommended, given AMA.     MSAFP is the appropriate second trimester screening test for open neural tube defects; the maternal quad screen is not recommended.    Her results were faxed to her primary OB provider for review.    Padmini Winter MS, [facility]  Licensed Genetic Counselor   [facility]   Maternal Fetal Medicine Centers  [email or web address]   Office: [phone] MFM: [phone]  Pager: [phone] Fax: [phone]

## 2019-05-07 LAB — LAB SCANNED RESULT: NORMAL

## 2019-05-24 ENCOUNTER — HOSPITAL ENCOUNTER (OUTPATIENT)
Dept: LAB | Facility: CLINIC | Age: 34
Discharge: HOME | End: 2019-05-24
Attending: OBSTETRICS & GYNECOLOGY | Admitting: OBSTETRICS & GYNECOLOGY
Payer: COMMERCIAL

## 2019-05-24 ENCOUNTER — HOSPITAL ENCOUNTER (OUTPATIENT)
Dept: ULTRASOUND IMAGING | Facility: CLINIC | Age: 34
Discharge: HOME | End: 2019-05-24
Attending: OBSTETRICS & GYNECOLOGY | Admitting: OBSTETRICS & GYNECOLOGY
Payer: COMMERCIAL

## 2019-05-24 ENCOUNTER — OFFICE VISIT (OUTPATIENT)
Dept: MATERNAL FETAL MEDICINE | Facility: CLINIC | Age: 34
End: 2019-05-24
Attending: OBSTETRICS & GYNECOLOGY
Payer: COMMERCIAL

## 2019-05-24 DIAGNOSIS — J95.89 ACUTE RESPIRATORY INSUFFICIENCY, POSTOPERATIVE: ICD-10-CM

## 2019-05-24 DIAGNOSIS — O28.3 ABNORMAL PRENATAL ULTRASOUND: ICD-10-CM

## 2019-05-24 DIAGNOSIS — O35.9XX0 SUSPECTED FETAL ANOMALY, ANTEPARTUM, SINGLE OR UNSPECIFIED FETUS: ICD-10-CM

## 2019-05-24 DIAGNOSIS — O28.3 ABNORMAL PRENATAL ULTRASOUND: Primary | ICD-10-CM

## 2019-05-24 DIAGNOSIS — O43.112 CIRCUMVALLATE PLACENTA DURING PREGNANCY IN SECOND TRIMESTER, ANTEPARTUM: ICD-10-CM

## 2019-05-24 DIAGNOSIS — O09.522 ELDERLY MULTIGRAVIDA IN SECOND TRIMESTER: Primary | ICD-10-CM

## 2019-05-24 PROCEDURE — 36415 COLL VENOUS BLD VENIPUNCTURE: CPT | Performed by: OBSTETRICS & GYNECOLOGY

## 2019-05-24 PROCEDURE — 40000072 ZZH STATISTIC GENETIC COUNSELING, < 16 MIN: Mod: ZF | Performed by: GENETIC COUNSELOR, MS

## 2019-05-24 PROCEDURE — 40000791 ZZHCL STATISTIC VERIFI PRENATAL TRISOMY 21,18,13: Performed by: OBSTETRICS & GYNECOLOGY

## 2019-05-24 PROCEDURE — 76811 OB US DETAILED SNGL FETUS: CPT

## 2019-05-24 NOTE — PROGRESS NOTES
[facility]  Genetic Counseling Consult    Patient:  Maya Dave YOB: 1985   Date of Service:  19      Maya Dave was seen at the [facility] for genetic consultation as part of her appointment for comprehensive ultrasound.  The indication for genetic counseling is right sided aortic arch on ultrasound. She was accompanied by her .       Impression/Plan:   I met with Kasey earlier in the pregnancy (2019) and she had normal NIPT results. Today I met with her at the request of Dr. 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    1. Maya had a cell-free fetal DNA test earlier in pregnancy, which was normal.    2. Maya had a comprehensive (level II) ultrasound today.  Please see the ultrasound report for further details.    3. The patient had a blood draw for NIPT (Innatal with microdeletions test through [facility]).  Results are expected within 7-10 days, and will be available in [place].  We will contact her to discuss the results, and a copy will be forwarded to the office of the referring OB provider.    Pregnancy History:   /Parity:    Age at Delivery: 34 year old  RORY: 10/17/2019, by Ultrasound  Gestational Age: 19w1d    Maya s pregnancy history is significant for three full term pregnancies without complications.    She does report experiencing migraines during pregnancy that typically last a couple of days  Medical History:   Maya coulter reported medical history is not expected to impact pregnancy management or risks to fetal development.       Family History:   A three-generation pedigree was obtained on 2019, and is scanned under the  Media  tab. Please see that note for more details.     Carrier Screening:   Carrier screening was not discussed today.       Risk Assessment for Chromosome Conditions:   We explained that the risk for fetal  chromosome abnormalities increases with maternal age. We discussed specific features of common chromosome abnormalities, including Down syndrome, trisomy 13, trisomy 18, and sex chromosome trisomies.      At age 34 at midtrimester, the risk to have a baby with Down syndrome is 1 in 274.     At age 34 at midtrimester, the risk to have a baby with any chromosome abnormality is 1 in 135.       Maya had maternal serum screening earlier in pregnancy.     Non-invasive Prenatal Testing (NIPT)    Maternal plasma cell-free DNA testing    Screens for fetal trisomy 21, trisomy 13, trisomy 18, and sex chromosome aneuploidy    First trimester ultrasound with nuchal translucency and nasal bone assessment was not performed in this pregnancy, to our knowledge.    Maya had a Innatal test earlier in pregnancy; we reviewed the results today, which are normal for chromosome 13, chromosome 18 and chromosome 21 (no aneuploidy detected)    Given the accuracy of this test, these results greatly decrease the chance for certain fetal chromosome abnormalities    We discussed the limitations of normal NIPT results    MSAFP (after 15 weeks for open neural tube defect screening) results were not available for our review today.         Testing Options:   We discussed the following options:   Non-invasive Prenatal Testing (NIPT)    Maternal plasma cell-free DNA testing; first trimester ultrasound with nuchal translucency and nasal bone assessment is recommended, when appropriate    Screens for fetal trisomy 21, trisomy 13, trisomy 18, and sex chromosome aneuploidy    Cannot screen for open neural tube defects; maternal serum AFP after 15 weeks is recommended       Genetic Amniocentesis    Invasive procedure typically performed in the second trimester by which amniotic fluid is obtained for the purpose of chromosome analysis and/or other prenatal genetic analysis    Diagnostic results; >99% sensitivity for fetal chromosome  abnormalities    AFAFP measurement tests for open neural tube defects       Comprehensive (Level II) ultrasound: Detailed ultrasound performed between 18-22 weeks gestation to screen for major birth defects and markers for aneuploidy.    We discussed the right sided aortic arch could be a benign variant or could indicate an increased chance of a heart defect. Certain heart defects are more common in a condition such as 22q11.2 deletion syndrome. Kasey will be having an fetal echocardiogram on 06/05 and that may provide some additional information on the likelihood of such a condition. Dr. Peterson did offer the option of amniocentesis but discussed this could be delayed until after the echocardiogram provides more information. Today Kasey decided to do the NIPT with added microdeletions which includes 22q11.2 deletion syndrome. We discussed this testing is less accurate than NIPT for conditions like Down syndrome. The lab has provided numbers for specificity and sensitivity but they are based on small studies. Therefore, it is difficult to determine what the positive or negative predictive value is. If the results are normal it is reassuring, especially if the fetal echocardiogram is otherwise normal. If the results are abnormal, this would increase the chance of the condition, but not diagnosis. An abnormal fetal echocardiogram could support an abnormal NIPT result. Kasey thought it would be helpful to get some information from a non-invasive route. We reviewed there may be a significant unknown chance for a false positive or negative results. Kasey seemed to have a good understanding of this information and wished to proceed.    22q11.2 deletion syndrome is also referred to as DiGeorge syndrome and velo-cardio-facial syndrome.  This genetic condition involves a deletion of a small region of DNA on chromosome 22.  This condition is extremely variable in how it presents in affected individuals, but some symptoms  include: congenital heart defects, with conotruncal defects such as tetralogy of fallot being most common, cleft palate, learning difficulties, immune system deficiencies, renal anomalies, and hearing loss.  Individuals with this genetic condition may have some, many, or very few symptoms, and there are individuals who have this condition but are unaware of it because of a mild presentation. Depending on the results we will talk more about 22q11.2 deletion during our result discussion.          We reviewed the benefits and limitations of this testing.  Screening tests provide a risk assessment specific to the pregnancy for certain fetal chromosome abnormalities, but cannot definitively diagnose or exclude a fetal chromosome abnormality.  Follow-up genetic counseling and consideration of diagnostic testing is recommended with any abnormal screening result.     Diagnostic tests carry inherent risks- including risk of miscarriage- that require careful consideration.  These tests can detect fetal chromosome abnormalities with greater than 99% certainty.  Results can be compromised by maternal cell contamination or mosaicism, and are limited by the resolution of cytogenetic G-banding technology.  There is no screening nor diagnostic test that can detect all forms of birth defects or mental disability.    It was a pleasure to be involved with Maya coulter janelle. Face-to-face time of the meeting was 15 minutes.      Padmini Winter MS, [facility]  Licensed Genetic Counselor   [facility]   Maternal Fetal Medicine Centers  [email or web address]   Office: [phone] [facility]: [phone]  Pager: [phone] Fax: [phone]

## 2019-05-24 NOTE — PROGRESS NOTES
"Please see \"Imaging\" tab under \"Chart Review\" for details of today's visit.    Ricki Peterson    "

## 2019-05-28 ENCOUNTER — TELEPHONE (OUTPATIENT)
Dept: MATERNAL FETAL MEDICINE | Facility: CLINIC | Age: 34
End: 2019-05-28

## 2019-05-28 DIAGNOSIS — O28.3 ABNORMAL FETAL ULTRASOUND: Primary | ICD-10-CM

## 2019-05-28 NOTE — TELEPHONE ENCOUNTER
Kasey called to discuss her pregnancy plan. 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 At that time, Kasey choose to do NIPT with microdeletion. Those results are still pending. Kasey and her  have decided to pursue an amniocentesis. However, Kasey would like to gain as much information before the making the final decision on the amniocentesis. Kasey explained that she and her  would not terminate a pregnancy and would like testing for information purposes only.     If the pending NIPT with microdeletions result is positive for 22q11.2 deletion syndrome Kasey believes she would want the amniocentesis as soon as possible. However, if the result is negative, Kasey would like to do the amniocentesis sometime after her fetal echocardiogram on 06/05.     I will work with the schedulers to check availability and will call Kasey back either today or tomorrow with a plan.     Padmini Winter MS, [facility]  Licensed Genetic Counselor   [facility]   Maternal Fetal Medicine Centers  [email or web address]   Office: [phone] MF: [phone]  Pager: [phone] Fax: [phone]

## 2019-05-29 ENCOUNTER — TELEPHONE (OUTPATIENT)
Dept: MATERNAL FETAL MEDICINE | Facility: CLINIC | Age: 34
End: 2019-05-29

## 2019-05-29 NOTE — TELEPHONE ENCOUNTER
Left brief message for Kasey explaining that I had two available appointment times on June 7 to schedule an amniocentesis. We discussed her decision to have an amniocentesis the day prior. I encouraged her to call me back to discuss these options.     Padmini Winter MS, [facility]  Licensed Genetic Counselor   [facility]   Maternal Fetal Medicine Centers  [email or web address]   Office: [phone] MFM: [phone]  Pager: [phone] Fax: [phone]

## 2019-05-31 ENCOUNTER — TELEPHONE (OUTPATIENT)
Dept: MATERNAL FETAL MEDICINE | Facility: CLINIC | Age: 34
End: 2019-05-31

## 2019-05-31 NOTE — TELEPHONE ENCOUNTER
Called Maya and discussed normal NIPT results. NIPT results indicate NO ANEUPLOIDY DETECTED for chromosomes 21, 18, 13, or sex chromosomes (XX). Sex was NOT disclosed.    This puts her current pregnancy at low risk for Down syndrome, trisomy 18, trisomy 13 and sex chromosome abnormalities. This test is reported to have the following sensitivities: Down syndrome- 99%, trisomy 18- 98%, and trisomy 13- 98%.     The microdeletion analysis portion of her NIPT testing indicated NO MICRODELETIONS DETECTED for chromosome 22q11.2, 15q11.2, 1p36, 4p- and 5p-.  Although these results are reassuring, this does not replace a standard chromosome analysis or CGH array from an amniocentesis.     A copy of her results were faxed to her primary OB.    Kasey has her fetal echocardiogram on June 5. She will also meet with a genetic counselor that day and has an amniocentesis scheduled. She is planning to decide on the amniocentesis depending on the echocardiogram results.    Padmini Winter MS, [facility]  Licensed Genetic Counselor   [facility]   Maternal Fetal Medicine Centers  [email or web address]   Office: [phone] MFM: [phone]  Pager: [phone] Fax: [phone]

## 2019-06-05 ENCOUNTER — HOSPITAL ENCOUNTER (OUTPATIENT)
Dept: CARDIOLOGY | Facility: CLINIC | Age: 34
Discharge: HOME | End: 2019-06-05
Attending: OBSTETRICS & GYNECOLOGY | Admitting: OBSTETRICS & GYNECOLOGY
Payer: COMMERCIAL

## 2019-06-05 ENCOUNTER — OFFICE VISIT (OUTPATIENT)
Dept: MATERNAL FETAL MEDICINE | Facility: CLINIC | Age: 34
End: 2019-06-05
Attending: OBSTETRICS & GYNECOLOGY
Payer: COMMERCIAL

## 2019-06-05 ENCOUNTER — HOSPITAL ENCOUNTER (OUTPATIENT)
Dept: ULTRASOUND IMAGING | Facility: CLINIC | Age: 34
End: 2019-06-05
Attending: OBSTETRICS & GYNECOLOGY
Payer: COMMERCIAL

## 2019-06-05 DIAGNOSIS — O09.522 SUPERVISION OF ELDERLY MULTIGRAVIDA IN SECOND TRIMESTER: ICD-10-CM

## 2019-06-05 DIAGNOSIS — O35.9XX0 SUSPECTED FETAL ANOMALY, ANTEPARTUM, SINGLE OR UNSPECIFIED FETUS: ICD-10-CM

## 2019-06-05 DIAGNOSIS — O28.3 ABNORMAL FETAL ULTRASOUND: Primary | ICD-10-CM

## 2019-06-05 DIAGNOSIS — O28.3 ABNORMAL FETAL ULTRASOUND: ICD-10-CM

## 2019-06-05 LAB — MATERNAL CELL CONTAMINATION MOL ANALYSIS: NORMAL

## 2019-06-05 PROCEDURE — 81229 CYTOG ALYS CHRML ABNR SNPCGH: CPT | Performed by: OBSTETRICS & GYNECOLOGY

## 2019-06-05 PROCEDURE — 88285 CHROMOSOME COUNT ADDITIONAL: CPT | Performed by: OBSTETRICS & GYNECOLOGY

## 2019-06-05 PROCEDURE — 81265 STR MARKERS SPECIMEN ANAL: CPT | Performed by: OBSTETRICS & GYNECOLOGY

## 2019-06-05 PROCEDURE — 76815 OB US LIMITED FETUS(S): CPT

## 2019-06-05 PROCEDURE — 88280 CHROMOSOME KARYOTYPE STUDY: CPT | Performed by: OBSTETRICS & GYNECOLOGY

## 2019-06-05 PROCEDURE — 36415 COLL VENOUS BLD VENIPUNCTURE: CPT | Mod: ZF

## 2019-06-05 PROCEDURE — 76825 ECHO EXAM OF FETAL HEART: CPT

## 2019-06-05 PROCEDURE — 88269 CHROMOSOME ANALYS AMNIOTIC: CPT | Performed by: OBSTETRICS & GYNECOLOGY

## 2019-06-05 PROCEDURE — 59000 AMNIOCENTESIS DIAGNOSTIC: CPT | Performed by: OBSTETRICS & GYNECOLOGY

## 2019-06-05 PROCEDURE — 82106 ALPHA-FETOPROTEIN AMNIOTIC: CPT | Performed by: OBSTETRICS & GYNECOLOGY

## 2019-06-05 PROCEDURE — 88235 TISSUE CULTURE PLACENTA: CPT | Performed by: OBSTETRICS & GYNECOLOGY

## 2019-06-05 PROCEDURE — 40001082 ZZHCL STATISTIC MATERNAL CELL CONTAM MOLEC: Performed by: OBSTETRICS & GYNECOLOGY

## 2019-06-05 PROCEDURE — 96040 ZZH GENETIC COUNSELING, EACH 30 MINUTES: CPT | Mod: ZF | Performed by: GENETIC COUNSELOR, MS

## 2019-06-05 NOTE — PROGRESS NOTES
Please refer to ultrasound report under 'Imaging' Studies of 'Chart Review' tabs.    Keshav Tucker M.D.

## 2019-06-05 NOTE — PROGRESS NOTES
[facility] [place]  Genetic Counseling Consult    Patient:  Maya Dave YOB: 1985   Date of Service:  6/05/19      Maya Dave was seen at the [facility] [place] for genetic consultation as part of her appointment for amniocentesis.The indication for genetic counseling was discussion and consent for amniocentesis. She was accompanied by her .        Impression/Plan:   1. Maya had a cell-free fetal DNA test earlier in pregnancy, which was normal. 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 We discussed that while these results are reassuring it does not eliminate the screened conditions and there is insufficient data available to estimate the negative predictive value.     2. Maya had a fetal echocardiogram today which confirmed the right sided aortic arch but did not find other cardiac anomalies. Please see the report for further details. We met again after the echocardiogram to discuss the option of amniocentesis. We did discuss the individuals with 22q11.2 deletion syndrome can be born with a range of cardiac anomalies from none at all to more than a right sided aortic arch. This means some individuals with 22q11.2 deletion syndrome have been born with only a right sided aortic arch. Dr. Tucker also met with them briefly before to discuss more details regarding the procedure. Maya decided that she ultimately wanted to know the information an amniocentesis would provide and decided to elect for the procedure.     3. The patient had an ultrasound and amniocentesis. The following testing was ordered on the prenatal sample: Chromosome analysis, AFAFP and microarray.  Results are expected within 7-10 days, and will be available in [facility].  We will contact her to  discuss the results, and a copy will be forwarded to the office of the referring OB provider.    Pregnancy History:   /Parity:    Age at Delivery: 34 year old  RORY: 10/17/2019, by Ultrasound  Gestational Age: 20w6d    Maya coulter pregnancy history is significant for three full term pregnancies without complications.    Medical History:   Maya coulter reported medical history is not expected to impact pregnancy management or risks to fetal development.       Family History:   A three-generation pedigree was obtained on 2019, and is scanned under the  Media  tab. Please see that note for more details.     Carrier Screening:   Carrier screening was not discussed today.       Risk Assessment for Chromosome Conditions:   We explained that the risk for fetal chromosome abnormalities increases with maternal age. We discussed specific features of common chromosome abnormalities, including Down syndrome, trisomy 13, trisomy 18, and sex chromosome trisomies.      At age 34 at midtrimester, the risk to have a baby with Down syndrome is 1 in 274.     At age 34 at midtrimester, the risk to have a baby with any chromosome abnormality is 1 in 135.       Maya had maternal serum screening earlier in pregnancy.    Non-invasive Prenatal Testing (NIPT)    Maternal plasma cell-free DNA testing    Screens for fetal trisomy 21, trisomy 13, trisomy 18, and sex chromosome aneuploidy    First trimester ultrasound with nuchal translucency and nasal bone assessment was not performed in this pregnancy, to our knowledge.    Maya had a Innatal test earlier in pregnancy; we reviewed the results today, which are normal for chromosome 13, chromosome 18 and chromosome 21 (no aneuploidy detected). She also had the added microdeletions which were negative.     Given the accuracy of this test, these results greatly decrease the chance for certain fetal chromosome abnormalities    We discussed the limitations of normal NIPT  results    MSAFP (after 15 weeks for open neural tube defect screening) results were not available for our review today.         Testing Options:    The benefits, risks, and limitations of amniocentesis were reviewed in detail. Maya would like to proceed with amniocentesis today.      Genetic Amniocentesis  - This is an invasive procedure typically performed at 15 weeks or later, through which amniotic fluid is obtained for the purpose of chromosome analysis and/or other prenatal genetic analysis.  - Amniocentesis is considered a diagnostic test for chromosome problems during pregnancy.  - The risk of pregnancy loss associated with amniocentesis is generally estimated to be 1/500 or less.  - Amniotic fluid AFP measurement is also done to screen for the possibility of open neural tube or ventral defects.     We reviewed the amniocentesis procedure consent form as well as the genetic testing consent form. All questions were answered to their apparent satisfaction. The following testing was ordered on the prenatal sample: Chromosome analysis, AFAFP, and genomic microarray (aCGH) via [facility] laboratory. Results of the chromosome analysis are expected within 7-10 days and microarray in 7-10 days. AFAFP results typically take 1-4 days. These results will be available in [facility].  We will contact her to discuss the results, and a copy will be forwarded to the office of the referring OB provider.     We reviewed the benefits and limitations of this testing.  Screening tests provide a risk assessment specific to the pregnancy for certain fetal chromosome abnormalities, but cannot definitively diagnose or exclude a fetal chromosome abnormality.  Follow-up genetic counseling and consideration of diagnostic testing is recommended with any abnormal screening result.     Diagnostic tests carry inherent risks- including risk of miscarriage- that require careful consideration.  These tests can detect fetal chromosome abnormalities  with greater than 99% certainty.  Results can be compromised by maternal cell contamination or mosaicism, and are limited by the resolution of cytogenetic G-banding technology.  There is no screening nor diagnostic test that can detect all forms of birth defects or mental disability.    It was a pleasure to be involved with Maya coulter care. Face-to-face time of the meeting was 30 minutes.      Padmini Winter MS, [facility]  Licensed Genetic Counselor   [facility]  [email or web address]   Office: [phone] MF: [phone]  Pager: [phone] Fax: [phone]

## 2019-06-05 NOTE — NURSING NOTE
Pt here for amniocentesis d/t fetal cardiac anomaly. Saw [facility], see their dictation.  After consent signed and TimeOut completed, Dr. Tucker withdrew adequate fluid x1 transabdominal pass.    Patient reports no pain.  Pt is RH positive.  MD reviewed blood type and screen and Rhogam not indicated. Discharge teaching completed and questions answered.  Pt discharged ambulatory and stable.   Lab alerted by calling phone number on amnio work-aid for [facility] site.  Cytogenetics notified of specimen.  Specimen transported to main lab, warm hand-off completed.  Gardenia Flowers RN

## 2019-06-06 LAB — LAB SCANNED RESULT: NORMAL

## 2019-06-07 ENCOUNTER — TELEPHONE (OUTPATIENT)
Dept: MATERNAL FETAL MEDICINE | Facility: CLINIC | Age: 34
End: 2019-06-07

## 2019-06-07 LAB
A-FETO PROTEIN  AMNGEST AGE ULTRA: NORMAL
AFP AMN-MCNC: 4723 NG/ML
AFP INTERP AMN-IMP: NEGATIVE
AFP MOM AMN: 0.87
GA: NORMAL WK
LMP START DATE: NORMAL

## 2019-06-07 NOTE — TELEPHONE ENCOUNTER
Left message for Kasey with normal amniotic fluid AFP levels. The AFAFP is a standard test we add after an amniocentesis to confirm there is no concerns for an open neural tube defect. These results are normal. I will likely connect with Kasey next week to discuss the microarray results.    Padmini Winter MS, [facility]  Licensed Genetic Counselor   [facility]   [facility]  [email or web address]   Office: [phone] MF: [phone]  Pager: [phone] Fax: [phone]

## 2019-06-09 LAB
CHROM ANALY RESULT (ISCN): NORMAL
PATHOLOGY STUDY: NORMAL
SERVICE CMNT-IMP: YES
SPECIMEN SOURCE: NORMAL

## 2019-06-10 ENCOUNTER — TELEPHONE (OUTPATIENT)
Dept: MATERNAL FETAL MEDICINE | Facility: CLINIC | Age: 34
End: 2019-06-10

## 2019-06-10 NOTE — TELEPHONE ENCOUNTER
Called Maya to discuss the normal microarray result from her amniocentesis. She pursued the amniocentesis for right-sided aortic arch as the concern for 22q11.2 deletion syndrome was mentioned. The microarray result was normal with no clinically significant abnormalities.     NORMAL MICROARRAY RESULT   Genetic results   ISCN: arr(1-22,X)x2 (hg19) (normal female result)   The cytogenomic microarray analysis indicated no clinically   significant abnormalities and is consistent with a female   chromosome complement.     Sex was NOT disclosed. However, I did mentioned that these results confirmed the sex chromosome results of the non-invasive prenatal screening results.    Kasey was excited to hear this news. We discussed there is still the possibility of single gene disorder but there are not indications to prompt testing for those. This supports the right-sided aortic arch being an isolated finding but I encouraged Kasey to continue following all recommendations for ultrasound and echocardiogram after birth. Kasey will return for her next follow up ultrasound on July 5.     We are still awaiting final chromosome (karyotype) results which will likely be normal, based on the normal microarray results. A karyotype is typically most useful if the array result was abnormal.    These results will be faxed to Kasey's primary OB for review.            Padmini Winter MS, [facility]  Licensed Genetic Counselor   [facility]   Maternal Fetal Medicine Centers  [email or web address]   Office: [phone] [facility]: [phone]  Pager: [phone] Fax: [phone]

## 2019-06-14 LAB — COPATH REPORT: NORMAL

## 2019-06-17 ENCOUNTER — TELEPHONE (OUTPATIENT)
Dept: MATERNAL FETAL MEDICINE | Facility: CLINIC | Age: 34
End: 2019-06-17

## 2019-06-17 NOTE — TELEPHONE ENCOUNTER
Called Kasey to report normal chromosome analysis from her amniocentesis. The results was 46, XX which was expected based on the normal microarray from the amniocentesis. Sex was not disclosed per Kasey's wishes, however, I did confirm the result was consistent with the NIPT and microarray results.     I also mentioned to Kasey that I was following up on a family history discussion we had in our first meeting. Kasey's mother  from an aortic dissection at 49. About 20% of aortic dissections are due to a genetic condition and the other 80% are non inherited. Due to the chance of an inherited component, it may be reasonable to discuss with her provider about imaging her aorta. I also briefly mentioned there is genetic counseling and genetic testing focused on this type of family history if she was ever interested.      I encouraged Kasey to contact me if there are any questions.    Padmini Winter MS, [facility]  Licensed Genetic Counselor   [facility]   Maternal Fetal Medicine Centers  [email or web address]   Office: [phone] MF: [phone]  Pager: [phone] Fax: [phone]

## 2019-07-05 ENCOUNTER — OFFICE VISIT (OUTPATIENT)
Dept: MATERNAL FETAL MEDICINE | Facility: CLINIC | Age: 34
End: 2019-07-05
Attending: OBSTETRICS & GYNECOLOGY
Payer: COMMERCIAL

## 2019-07-05 ENCOUNTER — HOSPITAL ENCOUNTER (OUTPATIENT)
Dept: ULTRASOUND IMAGING | Facility: CLINIC | Age: 34
Discharge: HOME | End: 2019-07-05
Attending: OBSTETRICS & GYNECOLOGY | Admitting: OBSTETRICS & GYNECOLOGY
Payer: COMMERCIAL

## 2019-07-05 DIAGNOSIS — O35.BXX0 ANOMALY OF HEART OF FETUS AFFECTING PREGNANCY, ANTEPARTUM, SINGLE OR UNSPECIFIED FETUS: Primary | ICD-10-CM

## 2019-07-05 DIAGNOSIS — O43.119 CIRCUMVALLATE PLACENTA: ICD-10-CM

## 2019-07-05 DIAGNOSIS — O43.112 CIRCUMVALLATE PLACENTA DURING PREGNANCY IN SECOND TRIMESTER, ANTEPARTUM: ICD-10-CM

## 2019-07-05 DIAGNOSIS — O35.9XX0 SUSPECTED FETAL ANOMALY, ANTEPARTUM, SINGLE OR UNSPECIFIED FETUS: ICD-10-CM

## 2019-07-05 PROCEDURE — 76816 OB US FOLLOW-UP PER FETUS: CPT

## 2019-07-05 NOTE — PROGRESS NOTES
Please see ultrasound report under imaging tab for details on ultrasound performed today.    Adrienne Patiño MD  , OB/GYN  Maternal-Fetal Medicine  [email or web address]  [phone] (Academic office)  [phone] (Pager)

## 2019-07-16 ENCOUNTER — APPOINTMENT (OUTPATIENT)
Dept: VASCULAR SURGERY | Facility: CLINIC | Age: 34
End: 2019-07-16
Payer: COMMERCIAL

## 2019-07-16 PROCEDURE — 99207 ZZC VEINSOLUTIONS FREE SCREENING: CPT | Performed by: SURGERY

## 2019-08-02 ENCOUNTER — HOSPITAL ENCOUNTER (OUTPATIENT)
Dept: ULTRASOUND IMAGING | Facility: CLINIC | Age: 34
Discharge: HOME | End: 2019-08-02
Attending: OBSTETRICS & GYNECOLOGY | Admitting: OBSTETRICS & GYNECOLOGY
Payer: COMMERCIAL

## 2019-08-02 ENCOUNTER — OFFICE VISIT (OUTPATIENT)
Dept: MATERNAL FETAL MEDICINE | Facility: CLINIC | Age: 34
End: 2019-08-02
Attending: OBSTETRICS & GYNECOLOGY
Payer: COMMERCIAL

## 2019-08-02 DIAGNOSIS — O35.BXX0 ANOMALY OF HEART OF FETUS AFFECTING PREGNANCY, ANTEPARTUM, SINGLE OR UNSPECIFIED FETUS: Primary | ICD-10-CM

## 2019-08-02 DIAGNOSIS — O43.119 CIRCUMVALLATE PLACENTA: ICD-10-CM

## 2019-08-02 DIAGNOSIS — O35.BXX0 ANOMALY OF HEART OF FETUS AFFECTING PREGNANCY, ANTEPARTUM, SINGLE OR UNSPECIFIED FETUS: ICD-10-CM

## 2019-08-02 PROCEDURE — 76816 OB US FOLLOW-UP PER FETUS: CPT

## 2019-09-06 ENCOUNTER — OFFICE VISIT (OUTPATIENT)
Dept: MATERNAL FETAL MEDICINE | Facility: CLINIC | Age: 34
End: 2019-09-06
Attending: OBSTETRICS & GYNECOLOGY
Payer: COMMERCIAL

## 2019-09-06 ENCOUNTER — HOSPITAL ENCOUNTER (OUTPATIENT)
Dept: ULTRASOUND IMAGING | Facility: CLINIC | Age: 34
Discharge: HOME | End: 2019-09-06
Attending: OBSTETRICS & GYNECOLOGY | Admitting: OBSTETRICS & GYNECOLOGY
Payer: COMMERCIAL

## 2019-09-06 DIAGNOSIS — O35.BXX0 ANOMALY OF HEART OF FETUS AFFECTING PREGNANCY, ANTEPARTUM, SINGLE OR UNSPECIFIED FETUS: Primary | ICD-10-CM

## 2019-09-06 DIAGNOSIS — O35.BXX0 ANOMALY OF HEART OF FETUS AFFECTING PREGNANCY, ANTEPARTUM, SINGLE OR UNSPECIFIED FETUS: ICD-10-CM

## 2019-09-06 PROCEDURE — 76816 OB US FOLLOW-UP PER FETUS: CPT

## 2019-09-16 LAB — GROUP B STREP PCR: NORMAL

## 2019-09-18 LAB
GROUP B STREP PCR: NORMAL
GROUP B STREP PCR: NORMAL

## 2019-10-02 ENCOUNTER — HOSPITAL ENCOUNTER (INPATIENT)
Facility: CLINIC | Age: 34
LOS: 1 days | Discharge: HOME | End: 2019-10-03
Attending: OBSTETRICS & GYNECOLOGY | Admitting: OBSTETRICS & GYNECOLOGY
Payer: COMMERCIAL

## 2019-10-02 LAB
ABO + RH BLD: NORMAL
ABO + RH BLD: NORMAL
SPECIMEN EXP DATE BLD: NORMAL

## 2019-10-02 PROCEDURE — 10907ZC DRAINAGE OF AMNIOTIC FLUID, THERAPEUTIC FROM PRODUCTS OF CONCEPTION, VIA NATURAL OR ARTIFICIAL OPENING: ICD-10-PCS | Performed by: OBSTETRICS & GYNECOLOGY

## 2019-10-02 PROCEDURE — 86900 BLOOD TYPING SEROLOGIC ABO: CPT | Performed by: OBSTETRICS & GYNECOLOGY

## 2019-10-02 PROCEDURE — 12000035 ZZH R&B POSTPARTUM

## 2019-10-02 PROCEDURE — G0463 HOSPITAL OUTPT CLINIC VISIT: HCPCS

## 2019-10-02 PROCEDURE — 36415 COLL VENOUS BLD VENIPUNCTURE: CPT | Performed by: OBSTETRICS & GYNECOLOGY

## 2019-10-02 PROCEDURE — 25000132 ZZH RX MED GY IP 250 OP 250 PS 637: Performed by: OBSTETRICS & GYNECOLOGY

## 2019-10-02 PROCEDURE — 59025 FETAL NON-STRESS TEST: CPT

## 2019-10-02 PROCEDURE — 86780 TREPONEMA PALLIDUM: CPT | Performed by: OBSTETRICS & GYNECOLOGY

## 2019-10-02 PROCEDURE — 72200001 ZZH LABOR CARE VAGINAL DELIVERY SINGLE

## 2019-10-02 PROCEDURE — 25000125 ZZHC RX 250: Performed by: OBSTETRICS & GYNECOLOGY

## 2019-10-02 PROCEDURE — 86901 BLOOD TYPING SEROLOGIC RH(D): CPT | Performed by: OBSTETRICS & GYNECOLOGY

## 2019-10-02 RX ORDER — AMOXICILLIN 250 MG
2 CAPSULE ORAL 2 TIMES DAILY
Status: DISCONTINUED | OUTPATIENT
Start: 2019-10-02 | End: 2019-10-03 | Stop reason: HOSPADM

## 2019-10-02 RX ORDER — IBUPROFEN 400 MG/1
800 TABLET, FILM COATED ORAL EVERY 6 HOURS PRN
Status: DISCONTINUED | OUTPATIENT
Start: 2019-10-02 | End: 2019-10-03 | Stop reason: HOSPADM

## 2019-10-02 RX ORDER — PRENATAL VIT/IRON FUM/FOLIC AC 27MG-0.8MG
1 TABLET ORAL DAILY
Status: DISCONTINUED | OUTPATIENT
Start: 2019-10-03 | End: 2019-10-03 | Stop reason: HOSPADM

## 2019-10-02 RX ORDER — ACETAMINOPHEN 325 MG/1
650 TABLET ORAL EVERY 4 HOURS PRN
Status: DISCONTINUED | OUTPATIENT
Start: 2019-10-02 | End: 2019-10-02

## 2019-10-02 RX ORDER — ACETAMINOPHEN 325 MG/1
650 TABLET ORAL EVERY 4 HOURS PRN
Status: DISCONTINUED | OUTPATIENT
Start: 2019-10-02 | End: 2019-10-03 | Stop reason: HOSPADM

## 2019-10-02 RX ORDER — METHYLERGONOVINE MALEATE 0.2 MG/ML
200 INJECTION INTRAVENOUS
Status: DISCONTINUED | OUTPATIENT
Start: 2019-10-02 | End: 2019-10-03 | Stop reason: HOSPADM

## 2019-10-02 RX ORDER — OXYCODONE AND ACETAMINOPHEN 5; 325 MG/1; MG/1
1 TABLET ORAL
Status: DISCONTINUED | OUTPATIENT
Start: 2019-10-02 | End: 2019-10-02

## 2019-10-02 RX ORDER — METHYLERGONOVINE MALEATE 0.2 MG/ML
200 INJECTION INTRAVENOUS
Status: DISCONTINUED | OUTPATIENT
Start: 2019-10-02 | End: 2019-10-02

## 2019-10-02 RX ORDER — IBUPROFEN 400 MG/1
800 TABLET, FILM COATED ORAL
Status: DISCONTINUED | OUTPATIENT
Start: 2019-10-02 | End: 2019-10-02

## 2019-10-02 RX ORDER — CARBOPROST TROMETHAMINE 250 UG/ML
250 INJECTION, SOLUTION INTRAMUSCULAR
Status: DISCONTINUED | OUTPATIENT
Start: 2019-10-02 | End: 2019-10-02

## 2019-10-02 RX ORDER — LANOLIN 100 %
OINTMENT (GRAM) TOPICAL
Status: DISCONTINUED | OUTPATIENT
Start: 2019-10-02 | End: 2019-10-03 | Stop reason: HOSPADM

## 2019-10-02 RX ORDER — AMOXICILLIN 250 MG
1 CAPSULE ORAL 2 TIMES DAILY
Status: DISCONTINUED | OUTPATIENT
Start: 2019-10-02 | End: 2019-10-03 | Stop reason: HOSPADM

## 2019-10-02 RX ORDER — NALOXONE HYDROCHLORIDE 0.4 MG/ML
.1-.4 INJECTION, SOLUTION INTRAMUSCULAR; INTRAVENOUS; SUBCUTANEOUS
Status: DISCONTINUED | OUTPATIENT
Start: 2019-10-02 | End: 2019-10-02

## 2019-10-02 RX ORDER — BISACODYL 10 MG
10 SUPPOSITORY, RECTAL RECTAL DAILY PRN
Status: DISCONTINUED | OUTPATIENT
Start: 2019-10-04 | End: 2019-10-03 | Stop reason: HOSPADM

## 2019-10-02 RX ORDER — OXYTOCIN 10 [USP'U]/ML
10 INJECTION, SOLUTION INTRAMUSCULAR; INTRAVENOUS
Status: DISCONTINUED | OUTPATIENT
Start: 2019-10-02 | End: 2019-10-02

## 2019-10-02 RX ORDER — HYDROCORTISONE 2.5 %
CREAM (GRAM) TOPICAL 3 TIMES DAILY PRN
Status: DISCONTINUED | OUTPATIENT
Start: 2019-10-02 | End: 2019-10-03 | Stop reason: HOSPADM

## 2019-10-02 RX ORDER — OXYTOCIN/0.9 % SODIUM CHLORIDE 30/500 ML
100 PLASTIC BAG, INJECTION (ML) INTRAVENOUS CONTINUOUS
Status: DISCONTINUED | OUTPATIENT
Start: 2019-10-02 | End: 2019-10-03 | Stop reason: HOSPADM

## 2019-10-02 RX ORDER — NALOXONE HYDROCHLORIDE 0.4 MG/ML
.1-.4 INJECTION, SOLUTION INTRAMUSCULAR; INTRAVENOUS; SUBCUTANEOUS
Status: DISCONTINUED | OUTPATIENT
Start: 2019-10-02 | End: 2019-10-03 | Stop reason: HOSPADM

## 2019-10-02 RX ORDER — SODIUM CHLORIDE, SODIUM LACTATE, POTASSIUM CHLORIDE, CALCIUM CHLORIDE 600; 310; 30; 20 MG/100ML; MG/100ML; MG/100ML; MG/100ML
INJECTION, SOLUTION INTRAVENOUS CONTINUOUS
Status: DISCONTINUED | OUTPATIENT
Start: 2019-10-02 | End: 2019-10-02

## 2019-10-02 RX ORDER — ONDANSETRON 2 MG/ML
4 INJECTION INTRAMUSCULAR; INTRAVENOUS EVERY 6 HOURS PRN
Status: DISCONTINUED | OUTPATIENT
Start: 2019-10-02 | End: 2019-10-02

## 2019-10-02 RX ORDER — OXYTOCIN/0.9 % SODIUM CHLORIDE 30/500 ML
340 PLASTIC BAG, INJECTION (ML) INTRAVENOUS CONTINUOUS PRN
Status: DISCONTINUED | OUTPATIENT
Start: 2019-10-02 | End: 2019-10-03 | Stop reason: HOSPADM

## 2019-10-02 RX ORDER — MISOPROSTOL 200 UG/1
400 TABLET ORAL
Status: DISCONTINUED | OUTPATIENT
Start: 2019-10-02 | End: 2019-10-03 | Stop reason: HOSPADM

## 2019-10-02 RX ORDER — OXYTOCIN 10 [USP'U]/ML
10 INJECTION, SOLUTION INTRAMUSCULAR; INTRAVENOUS
Status: DISCONTINUED | OUTPATIENT
Start: 2019-10-02 | End: 2019-10-03 | Stop reason: HOSPADM

## 2019-10-02 RX ORDER — OXYTOCIN/0.9 % SODIUM CHLORIDE 30/500 ML
100-340 PLASTIC BAG, INJECTION (ML) INTRAVENOUS CONTINUOUS PRN
Status: COMPLETED | OUTPATIENT
Start: 2019-10-02 | End: 2019-10-02

## 2019-10-02 RX ADMIN — IBUPROFEN 800 MG: 400 TABLET ORAL at 20:33

## 2019-10-02 RX ADMIN — ACETAMINOPHEN 650 MG: 325 TABLET, FILM COATED ORAL at 20:33

## 2019-10-02 RX ADMIN — Medication 340 ML/HR: at 19:40

## 2019-10-02 ASSESSMENT — ACTIVITIES OF DAILY LIVING (ADL)
AMBULATION: 0-->INDEPENDENT
BATHING: 0-->INDEPENDENT
COGNITION: 0 - NO COGNITION ISSUES REPORTED
RETIRED_EATING: 0-->INDEPENDENT
TOILETING: 0-->INDEPENDENT
DRESS: 0-->INDEPENDENT
TRANSFERRING: 0-->INDEPENDENT
RETIRED_COMMUNICATION: 0-->UNDERSTANDS/COMMUNICATES WITHOUT DIFFICULTY
FALL_HISTORY_WITHIN_LAST_SIX_MONTHS: NO
SWALLOWING: 0-->SWALLOWS FOODS/LIQUIDS WITHOUT DIFFICULTY

## 2019-10-02 NOTE — H&P
No significant change in general health status based on examination of the patient, review of Nursing Admission Database and prenatal record. 35yo  at 37.6 with early labor, h/o rapid labor. GBS negative.    AROM performed, yielding clear fluid. Fetal vertex well-applied to cervix. SVE 5/70/-2. FHR category 1.    EFW: 7lb to 7.5Concepción Guevara MD

## 2019-10-02 NOTE — PLAN OF CARE
Pt stated upon admission that GBS waS nEG.    Upon further review a pos result was found in epic     Jose RN will update Obi

## 2019-10-02 NOTE — PLAN OF CARE
2Data: Patient presented to [facility] at 1101.   Reason for maternal/fetal assessment per patient is Laboring  .  Patient is a . Prenatal record reviewed.      OB History    Para Term  AB Living   4 1 1 0 0 3   SAB TAB Ectopic Multiple Live Births   0 0 0 0 1      # Outcome Date GA Lbr Apolinar/2nd Weight Sex Delivery Anes PTL Lv   4 Current            3 Term 10/10/17 38w6d 16:00 / 00:17 3.66 kg (8 lb 1.1 oz) M   N KATYA      Name: VERENA CALDERA      Apgar1: 8  Apgar5: 9   2             1             . Medical history:   Past Medical History:   Diagnosis Date     Bad headache    . Gestational Age 37w6d. VSS. Fetal movement present. Patient denies cramping, backache, vaginal discharge, pelvic pressure, UTI symptoms, GI problems, bloody show, vaginal bleeding, edema, headache, visual disturbances, epigastric or URQ pain, abdominal pain, rupture of membranes. Support persons are present.  Action: Verbal consent for EFM. Triage assessment completed. EFM applied for fetal heart tones. Uterine assessment irregular. Fetal assessment: Presumed adequate fetal oxygenation documented (see flow record). Sve upon arrival 3-4 CM recheck was 4-5 cm  Response: Dr. Crocker informed of sve and cervical change. Plan per provider is admit will be over in a few hours to [facility]. Patient verbalized agreement with plan. Patient transferred to room 213 at 1420 ambulatory, oriented to room and call light. Report given to Jose.    IV hydration for   IV start time   IV stop time Face to Face time:

## 2019-10-03 VITALS
OXYGEN SATURATION: 98 % | TEMPERATURE: 97.9 F | DIASTOLIC BLOOD PRESSURE: 73 MMHG | RESPIRATION RATE: 16 BRPM | SYSTOLIC BLOOD PRESSURE: 109 MMHG | HEART RATE: 79 BPM

## 2019-10-03 LAB — T PALLIDUM AB SER QL: NONREACTIVE

## 2019-10-03 PROCEDURE — 25000132 ZZH RX MED GY IP 250 OP 250 PS 637: Performed by: OBSTETRICS & GYNECOLOGY

## 2019-10-03 RX ADMIN — IBUPROFEN 800 MG: 400 TABLET ORAL at 15:44

## 2019-10-03 RX ADMIN — ACETAMINOPHEN 650 MG: 325 TABLET, FILM COATED ORAL at 15:44

## 2019-10-03 RX ADMIN — ACETAMINOPHEN 650 MG: 325 TABLET, FILM COATED ORAL at 09:48

## 2019-10-03 RX ADMIN — SENNOSIDES AND DOCUSATE SODIUM 2 TABLET: 8.6; 5 TABLET ORAL at 08:47

## 2019-10-03 RX ADMIN — IBUPROFEN 800 MG: 400 TABLET ORAL at 02:32

## 2019-10-03 RX ADMIN — ACETAMINOPHEN 650 MG: 325 TABLET, FILM COATED ORAL at 05:44

## 2019-10-03 RX ADMIN — IBUPROFEN 800 MG: 400 TABLET ORAL at 08:47

## 2019-10-03 RX ADMIN — ACETAMINOPHEN 650 MG: 325 TABLET, FILM COATED ORAL at 00:43

## 2019-10-03 RX ADMIN — PRENATAL VIT W/ FE FUMARATE-FA TAB 27-0.8 MG 1 TABLET: 27-0.8 TAB at 08:47

## 2019-10-03 NOTE — PLAN OF CARE
at 1939 of a infant girl.  Postpartum cares started.  Patient ambulated in room to bathroom.  Will continue to monitor and update.

## 2019-10-03 NOTE — PLAN OF CARE
VSS.  Pain well controlled with tylenol/ibuprofen and donut pillow for sore coccyx. Utilizing Aqua K pad for cramping with feeds.Up independently in room.  Working on breastfeeding  and  cares. Questions and concerns addressed. Continue to monitor and notify MD as needed.

## 2019-10-03 NOTE — L&D DELIVERY NOTE
Delivery Date:  10/02/2019      Maya is a 34-year-old  4, now para 4, with intrauterine pregnancy at 37 weeks and 6 days who was admitted to Labor and Delivery complaining of regular uterine contractions.  On admission, she was 3 cm dilated, which she was in the office the week prior.   After ambulating for 2 hours she dilated to 5 cm.     Artificial rupture of membranes was performed by Dr. Guevara.  Clear fluid was noted.  The patient continued to ambulate. She also sat on  the labor ball.  She emanuel to feel pushy at which time she was checked and noted to be 8 cm dilated.  Within approximately 15 minutes she was completely dilated.  She pushed through approximately 4-5 contractions, delivering over an intact perineum.  The anterior shoulder delivered followed by the rest of body.  The baby was placed on the patient's abdomen.  Delayed cord clamping was observed for 1 minute, the cord was then doubly clamped and ligated by the father of the baby.  The patient was given IV Pitocin.  Cord bloods were obtained.      Placenta delivered within 15-20 minutes.  It was intact with 3 vessels.  Uterus became firm with manual massage.  Inspection of the perineum revealed that there were no lacerations.  The QEBL is 100 mL.  Baby weighed 7 pounds 6 ounces.  She had Apgars of 8 at 1 minute, 9 at 5 minutes.         JONATHAN SHI MD             D: 10/02/2019   T: 10/03/2019   MT: [email or web address]      Name:     MAYA CALDERA   MRN:      [id number]        Account:        [id number]   :      1985        Delivery Date:  10/02/2019               Document: [id number]       cc: Jonathan Shi MD

## 2019-10-03 NOTE — PLAN OF CARE
Dr. Guevara breaks the patient's bag of water at 1545, clear fluid noted. The patient becomes more uncomfortable at around 1800.  The patient is offered different pain relieving measures and the patient manages with the birthing ball with counter pressure to the sacral area due to back labor. The patient is checked at 1910 and she is 7-8 cm, Dr. Smith is called to come for delivery.  Dr. Smith arrives at the bedside at 1916 and checks the cervix, the patient is 8 cm.  Report is given to Adrianna Oneal RN.

## 2019-10-03 NOTE — PROGRESS NOTES
Patient arrived to room at 2215 with infant in arms. Spouse present. Received report from Saida CERON. Patient and family oriented to room and floor. Pulse oximeter applied. Call light within reach. Infant safety and use of bulb suction discussed. ID bands verified with outgoing RN. Questions/concerns addressed.

## 2019-10-03 NOTE — PLAN OF CARE
VSS.  Pain well controlled with tylenol/ibuprofen. Complaining of pain in tail bone, donut pillow given with good relief. Utilizing Aqua K pad for cramping with feeds.Up independently in room.  Working on breastfeeding  and  cares. Progressing per  care plan. Continue to monitor and notify MD as needed.

## 2019-10-03 NOTE — PLAN OF CARE
Data: Maya Dave transferred to [facility] via wheelchair at 2215. Baby transferred via parent's arms.  Action: Receiving unit notified of transfer: Yes. Patient and family notified of room change. Report given to Lyubov LAMBERT RN at 2215. Belongings sent to receiving unit. Accompanied by Registered Nurse. Oriented patient to surroundings. Call light within reach. ID bands double-checked with receiving RN.  Response: Patient tolerated transfer and is stable.

## 2019-10-03 NOTE — PROGRESS NOTES
[facility]   Obstetrics Progress Note    Subjective: This is the patient's first day since Vaginal delivery. She is doing well.  She is urinating on her own. Pain is controlled with medication.    Objective:   All vitals stable  Temp: 97.9  F (36.6  C) Temp src: Oral BP: 109/73 Pulse: 79 Heart Rate: 90 Resp: 16 SpO2: 98 % O2 Device: None (Room air)      EXAM:  Constitutional: healthy, alert, no distress.   Abdomen: Abdomen soft, non-tender. BS normal. No masses, fundus is firm.  JOINT/EXTREMITIES: extremities normal     Last hemoglobin was No results found for: HGB]    Assessment: Stable postpartum course.    Plan: Routine care. Pain control measures as needed  Reportable signs and symptoms dicussed with the patient  Discharge later today    Gardenia Joseph MD

## 2019-10-07 ENCOUNTER — HOSPITAL ENCOUNTER (OUTPATIENT)
Facility: CLINIC | Age: 34
End: 2019-10-07
Admitting: OBSTETRICS & GYNECOLOGY
Payer: COMMERCIAL

## 2021-07-06 ENCOUNTER — OFFICE VISIT (OUTPATIENT)
Dept: FAMILY MEDICINE | Facility: CLINIC | Age: 36
End: 2021-07-06
Payer: COMMERCIAL

## 2021-07-06 VITALS
DIASTOLIC BLOOD PRESSURE: 64 MMHG | OXYGEN SATURATION: 99 % | BODY MASS INDEX: 21.3 KG/M2 | HEART RATE: 102 BPM | RESPIRATION RATE: 16 BRPM | WEIGHT: 135.7 LBS | SYSTOLIC BLOOD PRESSURE: 102 MMHG | HEIGHT: 67 IN | TEMPERATURE: 97.5 F

## 2021-07-06 DIAGNOSIS — F41.1 GENERALIZED ANXIETY DISORDER: ICD-10-CM

## 2021-07-06 DIAGNOSIS — Z00.00 ROUTINE GENERAL MEDICAL EXAMINATION AT A HEALTH CARE FACILITY: Primary | ICD-10-CM

## 2021-07-06 DIAGNOSIS — Z13.220 SCREENING FOR HYPERLIPIDEMIA: ICD-10-CM

## 2021-07-06 DIAGNOSIS — Z82.49 FAMILY HISTORY OF PREMATURE CORONARY HEART DISEASE: ICD-10-CM

## 2021-07-06 DIAGNOSIS — Z11.59 NEED FOR HEPATITIS C SCREENING TEST: ICD-10-CM

## 2021-07-06 DIAGNOSIS — Z13.1 SCREENING FOR DIABETES MELLITUS: ICD-10-CM

## 2021-07-06 PROCEDURE — 80061 LIPID PANEL: CPT | Performed by: FAMILY MEDICINE

## 2021-07-06 PROCEDURE — 86803 HEPATITIS C AB TEST: CPT | Performed by: FAMILY MEDICINE

## 2021-07-06 PROCEDURE — 99385 PREV VISIT NEW AGE 18-39: CPT | Performed by: FAMILY MEDICINE

## 2021-07-06 PROCEDURE — 99214 OFFICE O/P EST MOD 30 MIN: CPT | Mod: 25 | Performed by: FAMILY MEDICINE

## 2021-07-06 PROCEDURE — 80048 BASIC METABOLIC PNL TOTAL CA: CPT | Performed by: FAMILY MEDICINE

## 2021-07-06 PROCEDURE — 96127 BRIEF EMOTIONAL/BEHAV ASSMT: CPT | Mod: 59 | Performed by: FAMILY MEDICINE

## 2021-07-06 PROCEDURE — 36415 COLL VENOUS BLD VENIPUNCTURE: CPT | Performed by: FAMILY MEDICINE

## 2021-07-06 RX ORDER — BUSPIRONE HYDROCHLORIDE 10 MG/1
10 TABLET ORAL 2 TIMES DAILY
Qty: 60 TABLET | Refills: 0 | Status: SHIPPED | OUTPATIENT
Start: 2021-07-06 | End: 2021-07-16 | Stop reason: SINTOL

## 2021-07-06 ASSESSMENT — ANXIETY QUESTIONNAIRES
1. FEELING NERVOUS, ANXIOUS, OR ON EDGE: NEARLY EVERY DAY
GAD7 TOTAL SCORE: 11
7. FEELING AFRAID AS IF SOMETHING AWFUL MIGHT HAPPEN: NOT AT ALL
2. NOT BEING ABLE TO STOP OR CONTROL WORRYING: NOT AT ALL
5. BEING SO RESTLESS THAT IT IS HARD TO SIT STILL: MORE THAN HALF THE DAYS
6. BECOMING EASILY ANNOYED OR IRRITABLE: NEARLY EVERY DAY
3. WORRYING TOO MUCH ABOUT DIFFERENT THINGS: NOT AT ALL

## 2021-07-06 ASSESSMENT — ENCOUNTER SYMPTOMS
CONSTIPATION: 0
EYE PAIN: 0
SHORTNESS OF BREATH: 0
JOINT SWELLING: 0
ARTHRALGIAS: 0
CHILLS: 0
HEADACHES: 0
HEARTBURN: 0
WEAKNESS: 0
ABDOMINAL PAIN: 0
PARESTHESIAS: 0
DYSURIA: 0
HEMATOCHEZIA: 0
FREQUENCY: 0
NERVOUS/ANXIOUS: 0
BREAST MASS: 0
DIARRHEA: 0
FEVER: 0
COUGH: 0
PALPITATIONS: 0
MYALGIAS: 0
NAUSEA: 0
DIZZINESS: 0
HEMATURIA: 0
SORE THROAT: 0

## 2021-07-06 ASSESSMENT — PATIENT HEALTH QUESTIONNAIRE - PHQ9
5. POOR APPETITE OR OVEREATING: NEARLY EVERY DAY
SUM OF ALL RESPONSES TO PHQ QUESTIONS 1-9: 0

## 2021-07-06 ASSESSMENT — MIFFLIN-ST. JEOR: SCORE: 1338.16

## 2021-07-06 NOTE — PROGRESS NOTES
SUBJECTIVE:   CC: Maya Dave is an 35 year old woman who presents for preventive health visit.     { Yes  Healthy Habits:     Getting at least 3 servings of Calcium per day:  Yes    Bi-annual eye exam:  Yes    Dental care twice a year:  Yes    Sleep apnea or symptoms of sleep apnea:  None    Diet:  Regular (no restrictions)    Frequency of exercise:  2-3 days/week    Duration of exercise:  30-45 minutes    Taking medications regularly:  Yes    Medication side effects:  None    PHQ-2 Total Score: 1    Additional concerns today:  Yes        Anxiety Follow-Up    How are you doing with your anxiety since your last visit? Worsened -she feels like she has been more on edge    Are you having other symptoms that might be associated with anxiety? No    Have you had a significant life event? No     Are you feeling depressed? No    Do you have any concerns with your use of alcohol or other drugs? No    PHQ 7/6/2021   PHQ-9 Total Score 0   Q9: Thoughts of better off dead/self-harm past 2 weeks Not at all     NIYAH-7 SCORE 7/6/2021   Total Score 11     She has taken several medications for mood in the past: Sertraline- not helpful, Hydroxyzine - too sleepy. Lexapro - ?, Citalopram - ?      Social History     Tobacco Use     Smoking status: Never Smoker     Smokeless tobacco: Never Used   Substance Use Topics     Alcohol use: No     Comment: rare     Drug use: No     NIYAH-7 SCORE 7/6/2021   Total Score 11     PHQ 7/6/2021   PHQ-9 Total Score 0   Q9: Thoughts of better off dead/self-harm past 2 weeks Not at all         Today's PHQ-2 Score:   PHQ-2 ( 1999 Pfizer) 7/6/2021   Q1: Little interest or pleasure in doing things 0   Q2: Feeling down, depressed or hopeless 1   PHQ-2 Score 1   Q1: Little interest or pleasure in doing things Not at all   Q2: Feeling down, depressed or hopeless Several days   PHQ-2 Score 1       Abuse: Current or Past (Physical, Sexual or Emotional) - No  Do you feel safe in your environment?  Yes    Have you ever done Advance Care Planning? (For example, a Health Directive, POLST, or a discussion with a medical provider or your loved ones about your wishes): No, advance care planning information given to patient to review.  Patient declined advance care planning discussion at this time.    Social History     Tobacco Use     Smoking status: Never Smoker     Smokeless tobacco: Never Used   Substance Use Topics     Alcohol use: No     Comment: rare     If you drink alcohol do you typically have >3 drinks per day or >7 drinks per week? Yes        Alcohol Use 7/6/2021   Prescreen: >3 drinks/day or >7 drinks/week? No   Prescreen: >3 drinks/day or >7 drinks/week? -       Reviewed orders with patient.  Reviewed health maintenance and updated orders accordingly - Yes  Lab work is in process    Breast Cancer Screening:  Any new diagnosis of family breast, ovarian, or bowel cancer? No    FHS-7:   Breast CA Risk Assessment (FHS-7) 7/6/2021   Did any of your first-degree relatives have breast or ovarian cancer? Yes   Did any of your relatives have bilateral breast cancer? No   Did any man in your family have breast cancer? No   Did any woman in your family have breast and ovarian cancer? No   Did any woman in your family have breast cancer before age 50 y? Yes   Do you have 2 or more relatives with breast and/or ovarian cancer? Yes   Do you have 2 or more relatives with breast and/or bowel cancer? No     Patient under 40 years of age: Routine Mammogram Screening not recommended.   Pertinent mammograms are reviewed under the imaging tab.    History of abnormal Pap smear: NO - age 30-65 PAP every 5 years with negative HPV co-testing recommended     Reviewed and updated as needed this visit by clinical staff  Tobacco       Nav [place]          Reviewed and updated as needed this visit by Provider        Nav [place]         Past Medical History:   Diagnosis Date     Bad headache       Past Surgical History:   Procedure  "Laterality Date     NO HISTORY OF SURGERY         Review of Systems   Constitutional: Negative for chills and fever.   HENT: Negative for congestion, ear pain, hearing loss and sore throat.    Eyes: Negative for pain and visual disturbance.   Respiratory: Negative for cough and shortness of breath.    Cardiovascular: Negative for chest pain, palpitations and peripheral edema.   Gastrointestinal: Negative for abdominal pain, constipation, diarrhea, heartburn, hematochezia and nausea.   Breasts:  Negative for tenderness, breast mass and discharge.   Genitourinary: Negative for dysuria, frequency, genital sores, hematuria, pelvic pain, urgency, vaginal bleeding and vaginal discharge.   Musculoskeletal: Negative for arthralgias, joint swelling and myalgias.   Skin: Negative for rash.   Neurological: Negative for dizziness, weakness, headaches and paresthesias.   Psychiatric/Behavioral: Positive for mood changes. The patient is not nervous/anxious.      CONSTITUTIONAL: NEGATIVE for fever, chills, change in weight  INTEGUMENTARU/SKIN: NEGATIVE for worrisome rashes, moles or lesions  EYES: NEGATIVE for vision changes or irritation  ENT: NEGATIVE for ear, mouth and throat problems  RESP: NEGATIVE for significant cough or SOB  BREAST: NEGATIVE for masses, tenderness or discharge  CV: NEGATIVE for chest pain, palpitations or peripheral edema  GI: NEGATIVE for nausea, abdominal pain, heartburn, or change in bowel habits  : NEGATIVE for unusual urinary or vaginal symptoms. Periods are regular.  MUSCULOSKELETAL: NEGATIVE for significant arthralgias or myalgia  NEURO: NEGATIVE for weakness, dizziness or paresthesias  PSYCHIATRIC: NEGATIVE for changes in mood or affect     OBJECTIVE:   /64   Pulse 102   Temp 97.5  F (36.4  C) (Tympanic)   Resp 16   Ht 1.702 m (5' 7\")   Wt 61.6 kg (135 lb 11.2 oz)   LMP 06/21/2021 (Approximate)   SpO2 99%   BMI 21.25 kg/m    Physical Exam  GENERAL: healthy, alert and no " distress  EYES: Eyes grossly normal to inspection, PERRL and conjunctivae and sclerae normal  HENT: ear canals and TM's normal, nose and mouth without ulcers or lesions  NECK: no adenopathy, no asymmetry, masses, or scars and thyroid normal to palpation  RESP: lungs clear to auscultation - no rales, rhonchi or wheezes  CV: regular rate and rhythm, normal S1 S2, no S3 or S4, no murmur, click or rub, no peripheral edema and peripheral pulses strong  MS: no gross musculoskeletal defects noted, no edema  SKIN: no suspicious lesions or rashes  PSYCH: mentation appears normal, affect normal/bright    Diagnostic Test Results:  Labs reviewed in Epic    ASSESSMENT/PLAN:   1. Routine general medical examination at a health care facility: Health maintenance reviewed and updated.  She is planning on following up with OB/GYN specialist for Pap smear.  Requested that she have them forward results to [place].    2. Generalized anxiety disorder  Symptoms not well controlled, discussed trial of new medication.  Will start BuSpar.  Follow-up in 4 weeks to see how things are going.    3. Need for hepatitis C screening test  Discussed USPSTF recommendation for one time hepatitis C screening for all adults aged 18 to 79 years.  Discussed risk factors for hepatitis C including blood transfusion and IV drug use.  - Hepatitis C Screen Reflex to HCV RNA Quant and Genotype    4. Screening for diabetes mellitus  - Basic metabolic panel  (Ca, Cl, CO2, Creat, Gluc, K, Na, BUN)    5. Screening for hyperlipidemia  - Lipid panel reflex to direct LDL Fasting    6. Family history of premature coronary heart disease  - Echocardiogram Complete; Future  - CT Coronary Calcium Scan; Future    Patient has been advised of split billing requirements and indicates understanding: Yes  COUNSELING:  Reviewed preventive health counseling, as reflected in patient instructions       Regular exercise       Healthy diet/nutrition    Estimated body mass index is  "21.25 kg/m  as calculated from the following:    Height as of this encounter: 1.702 m (5' 7\").    Weight as of this encounter: 61.6 kg (135 lb 11.2 oz).        She reports that she has never smoked. She has never used smokeless tobacco.      Counseling Resources:  ATP IV Guidelines  Pooled Cohorts Equation Calculator  Breast Cancer Risk Calculator  BRCA-Related Cancer Risk Assessment: FHS-7 Tool  FRAX Risk Assessment  ICSI Preventive Guidelines  Dietary Guidelines for Americans, 2010  USDA's MyPlate  ASA Prophylaxis  Lung CA Screening    José Pablo DO  [facility]  "

## 2021-07-07 LAB
ANION GAP SERPL CALCULATED.3IONS-SCNC: 4 MMOL/L (ref 3–14)
BUN SERPL-MCNC: 13 MG/DL (ref 7–30)
CALCIUM SERPL-MCNC: 8.6 MG/DL (ref 8.5–10.1)
CHLORIDE SERPL-SCNC: 104 MMOL/L (ref 94–109)
CHOLEST SERPL-MCNC: 194 MG/DL
CO2 SERPL-SCNC: 30 MMOL/L (ref 20–32)
CREAT SERPL-MCNC: 0.58 MG/DL (ref 0.52–1.04)
GFR SERPL CREATININE-BSD FRML MDRD: >90 ML/MIN/{1.73_M2}
GLUCOSE SERPL-MCNC: 77 MG/DL (ref 70–99)
HCV AB SERPL QL IA: NONREACTIVE
HDLC SERPL-MCNC: 98 MG/DL
LDLC SERPL CALC-MCNC: 78 MG/DL
NONHDLC SERPL-MCNC: 96 MG/DL
POTASSIUM SERPL-SCNC: 4 MMOL/L (ref 3.4–5.3)
SODIUM SERPL-SCNC: 138 MMOL/L (ref 133–144)
TRIGL SERPL-MCNC: 89 MG/DL

## 2021-07-07 ASSESSMENT — ANXIETY QUESTIONNAIRES: GAD7 TOTAL SCORE: 11

## 2021-07-16 ENCOUNTER — TELEPHONE (OUTPATIENT)
Dept: FAMILY MEDICINE | Facility: CLINIC | Age: 36
End: 2021-07-16

## 2021-07-16 ENCOUNTER — NURSE TRIAGE (OUTPATIENT)
Dept: NURSING | Facility: CLINIC | Age: 36
End: 2021-07-16

## 2021-07-16 DIAGNOSIS — F41.1 GENERALIZED ANXIETY DISORDER: Primary | ICD-10-CM

## 2021-07-16 RX ORDER — FLUOXETINE 10 MG/1
CAPSULE ORAL
Qty: 46 CAPSULE | Refills: 0 | Status: SHIPPED | OUTPATIENT
Start: 2021-07-16 | End: 2021-08-25

## 2021-07-16 NOTE — TELEPHONE ENCOUNTER
"\"Had a call back about a prescription switch.\"    Message from provider regarding prescription for Fluoxetine sent to [facility] relayed to patient, who said she would  the prescription in the morning 7-17-21.    Priscilla Cifuentes RN  [facility]    Reason for Disposition    Caller has medication question only, adult not sick, and triager answers question    Protocols used: MEDICATION QUESTION CALL-A-AH      "

## 2021-07-16 NOTE — TELEPHONE ENCOUNTER
S-(situation): Pt with anxiety    B-(background): Pt noted new medication change recently. Change from Sertraline to Buspar    A-(assessment): Pt stated she was seen several weeks ago, changed to Buspar but feels that she is getting more feelings of anger. Pt is having some dizziness as well. Pt denies SI or HI. Pt does feel safe. Writer discussed possible option of Share[facility] testing with Jyoti Alcantara. Pt is willing to consider.    R-(recommendations): routing to Dr. Pablo to review and advise. If change in medication please [facility].     Shawn KHAN RN   [facility]

## 2021-07-16 NOTE — TELEPHONE ENCOUNTER
She has tried several different medications. Genesight testing would be a good idea. If her symptoms of anger and dizziness are mild, she could give the medication a little more time and see if these effects improve, otherwise, she could just discontinue it.     José Pablo,   7/16/2021 10:54 AM

## 2021-07-16 NOTE — TELEPHONE ENCOUNTER
Called # [phone]     Pt called and advise of note. Pt does not want to continue Buspar. Pt did set up appt with Jyoti Alcantara for Genesight testing. Pt requested trial of another medication in meantime.     Routing to Dr. Pablo to review and advise.  Future Appointments   Date Time Provider Department Center   7/21/2021  8:30 AM [id number] [facility] RSCC   7/29/2021  3:00 PM Jyoti Alcantara, CRISTINA RVFP RV   8/4/2021  1:00 PM RHCT2 RHCT [place] [facility]         Shawn Mcbride, OSMANY   [facility] Triage

## 2021-07-16 NOTE — TELEPHONE ENCOUNTER
Okay to trial alternative medication. Will send Rx for fluoxetine (Prozac) to [facility].    José Pablo DO  7/16/2021 3:21 PM

## 2021-07-16 NOTE — TELEPHONE ENCOUNTER
Attempt # 1  Called # [phone]     Left a non detailed VM to call back at [phone] and ask for any available Triage Nurse.    Shawn Mcbride RN   [facility]

## 2021-07-21 ENCOUNTER — HOSPITAL ENCOUNTER (OUTPATIENT)
Dept: CARDIOLOGY | Facility: CLINIC | Age: 36
Discharge: HOME | End: 2021-07-21
Attending: FAMILY MEDICINE | Admitting: FAMILY MEDICINE
Payer: COMMERCIAL

## 2021-07-21 DIAGNOSIS — Z82.49 FAMILY HISTORY OF PREMATURE CORONARY HEART DISEASE: ICD-10-CM

## 2021-07-21 LAB — LVEF ECHO: NORMAL

## 2021-07-21 PROCEDURE — 93306 TTE W/DOPPLER COMPLETE: CPT | Mod: 26 | Performed by: INTERNAL MEDICINE

## 2021-07-21 PROCEDURE — 93306 TTE W/DOPPLER COMPLETE: CPT

## 2021-07-28 ENCOUNTER — TRANSFERRED RECORDS (OUTPATIENT)
Dept: HEALTH INFORMATION MANAGEMENT | Facility: CLINIC | Age: 36
End: 2021-07-28

## 2021-08-03 ENCOUNTER — NURSE TRIAGE (OUTPATIENT)
Dept: URGENT CARE | Facility: URGENT CARE | Age: 36
End: 2021-08-03

## 2021-08-03 NOTE — TELEPHONE ENCOUNTER
"RN reviewed [facility] Urgent Care locations with pt. Pt agrees with plan to be seen at an Urgent Care-type setting, but prefers to be seen at a different location that she is aware of outside of [facility] if they do sutures there. RN advised pt can check with the location outside [facility] that she prefers to see if they can accommodate her for laceration today, or she may come to the [facility] urgent care locations or preferred ED.    LORI Quinonez, RN    Reason for Disposition    Skin is split open or gaping (or length > 1/2 inch or 12 mm on the skin, 1/4 inch or 6 mm on the face)    Additional Information    Negative: [1] Major bleeding (e.g., actively dripping or spurting) AND [2] can't be stopped    Negative: Amputation    Negative: Shock suspected (e.g., cold/pale/clammy skin, too weak to stand, low BP, rapid pulse)    Negative: [1] Knife wound (or other possibly deep cut) AND [2] to chest, abdomen, back, neck, or head    Negative: [1] Cutter (self-mutilator) AND [2] suicidal or out-of-control    Negative: Sounds like a life-threatening emergency to the triager    Negative: [1] Animal bite AND [2] broken skin    Negative: [1] Human bite AND [2] broken skin    Negative: [1] Bleeding AND [2] won't stop after 10 minutes of direct pressure (using correct technique)    Negative: [1] Deep cut AND [2] can see bone or tendons    Negative: Sensation of something in the wound (i.e., retained object in wound)    Negative: [1] Dirt in the wound AND [2] not removed with 15 minutes of scrubbing    Negative: Wound causes numbness (i.e., loss of sensation)    Negative: Wound causes weakness (i.e., decreased ability to move hand, finger, toe)    Answer Assessment - Initial Assessment Questions  1. APPEARANCE of INJURY: \"What does the injury look like?\"       Lt hand pinky laceration from a knife  2. SIZE: \"How large is the cut?\"       Across whole top of pinky and through nail  3. BLEEDING: \"Is " "it bleeding now?\" If so, ask: \"Is it difficult to stop?\"       Bleeding is contained within the wrap she applied  4. LOCATION: \"Where is the injury located?\"       finger  5. ONSET: \"How long ago did the injury occur?\"       20 min ago  6. MECHANISM: \"Tell me how it happened.\"       knife  7. TETANUS: \"When was the last tetanus booster?\"        8. PREGNANCY: \"Is there any chance you are pregnant?\" \"When was your last menstrual period?\"    Protocols used: CUTS AND [id number]      "

## 2021-08-23 DIAGNOSIS — F41.1 GENERALIZED ANXIETY DISORDER: ICD-10-CM

## 2021-08-24 DIAGNOSIS — F41.1 GENERALIZED ANXIETY DISORDER: ICD-10-CM

## 2021-08-24 NOTE — TELEPHONE ENCOUNTER
Patient inquiring about refill. States she's been out of this for a couple days.     Mark Quiroz

## 2021-08-25 NOTE — TELEPHONE ENCOUNTER
Routing refill request to provider for review/approval because:  Pt was prescribed trial of fluoxetine.  She is requesting more refills.   Please advise if medication is to be refilled.     Judi Espinoza RN

## 2021-08-26 NOTE — TELEPHONE ENCOUNTER
Will send refill to patient. Rx will be for 20 mg capsules - take one capsule daily. Can we send new PHQ-9 and NIYAH-7 to patient as well to follow up on how the medication is working?    José Pablo DO  8/25/2021 10:15 PM

## 2021-11-29 ENCOUNTER — DOCUMENTATION ONLY (OUTPATIENT)
Dept: LAB | Facility: CLINIC | Age: 36
End: 2021-11-29

## 2021-11-29 NOTE — PROGRESS NOTES
Patient wants gene sight testing, explained that that is not a lab appointment.  Scheduled her for gene sight testing with Jyoti on December 9th    Cancelled today appointment       Camille Ramírez

## 2021-11-29 NOTE — PROGRESS NOTES
We just did her fasting lab work in July. There wasn't anything that needed any follow up on now. What is she coming in for?    José Pablo,   11/29/2021 2:59 PM

## 2021-12-09 ENCOUNTER — OFFICE VISIT (OUTPATIENT)
Dept: FAMILY MEDICINE | Facility: CLINIC | Age: 36
End: 2021-12-09
Payer: COMMERCIAL

## 2021-12-09 VITALS
OXYGEN SATURATION: 99 % | DIASTOLIC BLOOD PRESSURE: 62 MMHG | HEIGHT: 67 IN | WEIGHT: 130 LBS | BODY MASS INDEX: 20.4 KG/M2 | SYSTOLIC BLOOD PRESSURE: 102 MMHG | HEART RATE: 88 BPM | TEMPERATURE: 97.9 F

## 2021-12-09 DIAGNOSIS — F41.1 GENERALIZED ANXIETY DISORDER: Primary | ICD-10-CM

## 2021-12-09 PROCEDURE — 99214 OFFICE O/P EST MOD 30 MIN: CPT | Performed by: PHYSICIAN ASSISTANT

## 2021-12-09 ASSESSMENT — ANXIETY QUESTIONNAIRES
7. FEELING AFRAID AS IF SOMETHING AWFUL MIGHT HAPPEN: NOT AT ALL
6. BECOMING EASILY ANNOYED OR IRRITABLE: NEARLY EVERY DAY
7. FEELING AFRAID AS IF SOMETHING AWFUL MIGHT HAPPEN: NOT AT ALL
GAD7 TOTAL SCORE: 9
2. NOT BEING ABLE TO STOP OR CONTROL WORRYING: NOT AT ALL
GAD7 TOTAL SCORE: 9
1. FEELING NERVOUS, ANXIOUS, OR ON EDGE: NEARLY EVERY DAY
5. BEING SO RESTLESS THAT IT IS HARD TO SIT STILL: SEVERAL DAYS
3. WORRYING TOO MUCH ABOUT DIFFERENT THINGS: SEVERAL DAYS
4. TROUBLE RELAXING: SEVERAL DAYS
GAD7 TOTAL SCORE: 9

## 2021-12-09 ASSESSMENT — PATIENT HEALTH QUESTIONNAIRE - PHQ9
10. IF YOU CHECKED OFF ANY PROBLEMS, HOW DIFFICULT HAVE THESE PROBLEMS MADE IT FOR YOU TO DO YOUR WORK, TAKE CARE OF THINGS AT HOME, OR GET ALONG WITH OTHER PEOPLE: SOMEWHAT DIFFICULT
SUM OF ALL RESPONSES TO PHQ QUESTIONS 1-9: 2
SUM OF ALL RESPONSES TO PHQ QUESTIONS 1-9: 2

## 2021-12-09 ASSESSMENT — MIFFLIN-ST. JEOR: SCORE: 1307.31

## 2021-12-09 NOTE — PROGRESS NOTES
Assessment & Plan     Generalized anxiety disorder  Lengthy discussion regarding the benefits and utilization of GeneSight testing.  Completed today and we will keep patient apprised of results.  - GeneSight Psychotropic  - GeneSight MTHFR    Return in about 1 week (around 12/16/2021) for Triage will call with results/recommendations and report will be mailed as well..     31 minutes spent on the date of the encounter doing chart review, history and exam, documentation and further activities per the note      Jyoti Alcantara PA-C  [facility] JOSE A Trejo is a 36 year old who presents for the following health issues     History of Present Illness       Mental Health Follow-up:  Patient presents to follow-up on Depression & Anxiety.Patient's depression since last visit has been:  No change  The patient is not having other symptoms associated with depression.  Patient's anxiety since last visit has been:  Better  The patient is not having other symptoms associated with anxiety.  Any significant life events: No  Patient is not feeling anxious or having panic attacks.  Patient has no concerns about alcohol or drug use.     Social History  Tobacco Use    Smoking status: Never Smoker    Smokeless tobacco: Never Used  Alcohol use: No    Comment: rare  Drug use: No      Today's PHQ-9         PHQ-9 Total Score:     2   PHQ-9 Q9 Thoughts of better off dead/self-harm past 2 weeks :   Not at all   Thoughts of suicide or self harm:      Self-harm Plan:        Self-harm Action:          Safety concerns for self or others:           She eats 4 or more servings of fruits and vegetables daily.She consumes 1 sweetened beverage(s) daily.She exercises with enough effort to increase her heart rate 30 to 60 minutes per day.  She exercises with enough effort to increase her heart rate 3 or less days per week. She is missing 2 dose(s) of medications per week.       -gene sight testing -  "specifically for depression and anxiety     Tried/failed  sertraline - lost effect (on/off around pregnancies for 3-4 years)  wellbutrin - unsure if it worked or if had side effects -prepregnancy  Currently taking: Fluoxetine - better than sertraline -feels that anxiety is fairly well controlled but has some depression.  Is short tempered which is not typical for her.      Dr. Pablo as PCP    Answers for HPI/ROS submitted by the patient on 12/9/2021  If you checked off any problems, how difficult have these problems made it for you to do your work, take care of things at home, or get along with other people?: Somewhat difficult  PHQ9 TOTAL SCORE: 2  NIYAH 7 TOTAL SCORE: 9            Review of Systems   Constitutional, HEENT, cardiovascular, pulmonary, GI, , musculoskeletal, neuro, skin, endocrine and psych systems are negative, except as otherwise noted.      Objective    /62 (BP Location: Right arm, Cuff Size: Adult Regular)   Pulse 88   Temp 97.9  F (36.6  C) (Tympanic)   Ht 1.702 m (5' 7\")   Wt 59 kg (130 lb)   SpO2 99%   BMI 20.36 kg/m    Body mass index is 20.36 kg/m .  Physical Exam   GENERAL: healthy, alert and no distress  EYES: Eyes grossly normal to inspection  MS: no gross musculoskeletal defects noted, no edema  SKIN: no suspicious lesions or rashes  NEURO: Normal strength and tone, mentation intact and speech normal  PSYCH: mentation appears normal, affect normal/bright                "

## 2021-12-10 ASSESSMENT — ANXIETY QUESTIONNAIRES: GAD7 TOTAL SCORE: 9

## 2021-12-10 ASSESSMENT — PATIENT HEALTH QUESTIONNAIRE - PHQ9: SUM OF ALL RESPONSES TO PHQ QUESTIONS 1-9: 2

## 2021-12-13 ENCOUNTER — TELEPHONE (OUTPATIENT)
Dept: FAMILY MEDICINE | Facility: CLINIC | Age: 36
End: 2021-12-13
Payer: COMMERCIAL

## 2021-12-13 DIAGNOSIS — F41.1 GENERALIZED ANXIETY DISORDER: Primary | ICD-10-CM

## 2021-12-14 NOTE — TELEPHONE ENCOUNTER
"Triage:    Please call patient and advise of [place] results    A full color copy has been mailed to the patient's home    For antidepressants her current fluoxetine lies in her yellow \"use with caution \"column specifically stating that lower dosages may be required and use of this drug may increase her risk of side effects.  Her previous sertraline had the same clinical considerations.   Her previous Wellbutrin lies in her green \"use as directed \"column.    A few options that she does have is to #1 retry bupropion.  #2 transition from fluoxetine to desvenlafaxine (Pristiq) 50 mg once a day.  This lies in her green \"use as directed \"column and is usually very well tolerated.  If there are issues with insurance coverage (Pristiq is a brand-name medication) a couple other options are to use low dosages of duloxetine or venlafaxine which both lie in her yellow \"use with caution \"column stating low dosages may be required.    The patient has normal genotype for MTHFR and does not require any folic acid supplementation.    I will defer any medication changes to Dr. Pablo.  I will CC him on this note as well.  "

## 2021-12-14 NOTE — TELEPHONE ENCOUNTER
Called # [phone] (home)     Advised pt on the information below    Patient stated an understanding and agreed with plan.    Pt would like MD Angus review and advise if pt should be seen to discuss changes or can she just do it through messages?     thank you     Adrianna Bright RN, BSN  [facility]

## 2021-12-16 NOTE — TELEPHONE ENCOUNTER
Patient was given the information from the gene sight testing but now is waiting for advise on which medication to take and an RX for that medication so she can start taking it    Please call patient to let her know when RX is done and which med it is for       Camille Ramírez

## 2021-12-17 RX ORDER — DESVENLAFAXINE 50 MG/1
50 TABLET, FILM COATED, EXTENDED RELEASE ORAL DAILY
Qty: 30 TABLET | Refills: 0 | Status: SHIPPED | OUTPATIENT
Start: 2021-12-17 | End: 2021-12-17

## 2021-12-17 RX ORDER — VENLAFAXINE HYDROCHLORIDE 37.5 MG/1
37.5 CAPSULE, EXTENDED RELEASE ORAL DAILY
Qty: 30 CAPSULE | Refills: 0 | Status: SHIPPED | OUTPATIENT
Start: 2021-12-17 | End: 2021-12-18

## 2021-12-17 NOTE — TELEPHONE ENCOUNTER
"Given that her main symptoms are anxiety, I would recommend trying venlafaxine (Effexor). This is in her \"yellow, use with caution\" column showing that lower doses should be tried first. Start with 37.5 mg daily for the next month.     The reason why I would start with this one first is that it is approved for anxiety. Both Pristiq (desvenlafaxine) and Wellbutrin are in her green, use as directed column are typically used solely for depression (however, this does not necessarily mean that we couldn't try one of them in the future.)    We should then follow up in 1 month (virtual okay).    José Pablo, DO  12/17/2021 2:10 PM        "

## 2021-12-17 NOTE — TELEPHONE ENCOUNTER
Advised patient of Dr. Pablo's note. Patient would like Pristiq first.     Routing to provider to review and advise.     Darline Kaur RN  [place] Triage

## 2021-12-17 NOTE — TELEPHONE ENCOUNTER
Patient calling again.  States she was hoping to get a prescription before the weekend.    Please advise, thanks.

## 2021-12-18 RX ORDER — DESVENLAFAXINE 50 MG/1
50 TABLET, FILM COATED, EXTENDED RELEASE ORAL DAILY
Qty: 30 TABLET | Refills: 0 | Status: SHIPPED | OUTPATIENT
Start: 2021-12-18 | End: 2022-01-17

## 2021-12-22 NOTE — TELEPHONE ENCOUNTER
Pt called in request for Pristiq, Writer advised this was sent in. Advise no need to taper. Patient stated an understanding and agreed with plan.  Shawn KHAN RN   [facility]

## 2022-01-10 ENCOUNTER — TELEPHONE (OUTPATIENT)
Dept: FAMILY MEDICINE | Facility: CLINIC | Age: 37
End: 2022-01-10
Payer: COMMERCIAL

## 2022-01-10 DIAGNOSIS — F41.1 GENERALIZED ANXIETY DISORDER: Primary | ICD-10-CM

## 2022-01-10 NOTE — TELEPHONE ENCOUNTER
Pt calls asking if she could get Prozac and Pristiq together, even in smaller amounts.  She states she used to get Prozac but was changed and started on Pristiq. She did the iubenda testing last month.   Pt saw Jyoti Alcantara PA-C on 12/9/21    Having some anxiety starting, so wants to go back on the Prozac.     Please advise if should schedule a VV to discuss?     NIYAH-7 SCORE 7/6/2021 12/9/2021   Total Score - 9 (mild anxiety)   Total Score 11 9

## 2022-01-12 RX ORDER — FLUOXETINE 10 MG/1
10 CAPSULE ORAL DAILY
Qty: 30 CAPSULE | Refills: 0 | Status: SHIPPED | OUTPATIENT
Start: 2022-01-12 | End: 2022-02-02

## 2022-01-12 NOTE — TELEPHONE ENCOUNTER
We could try using both medications together but would want to use low doses and be very cautious as they both work at serotonin receptors and could potentially cause too high of serotonin levels. Symptoms to watch out for would be Agitation or, confusion, rapid heart rate, high blood pressure, muscle rigidity, sweating, diarrhea, headaches, shivering. Rx sent to pharmacy. We should follow up in 1 month to see how she is doing on medications. Does she need a refill of Pristiq?    José Pablo,   1/12/2022 9:14 AM

## 2022-01-13 DIAGNOSIS — F41.1 GENERALIZED ANXIETY DISORDER: ICD-10-CM

## 2022-01-14 NOTE — TELEPHONE ENCOUNTER
Attempt # 1  Called Phone # [phone]    Left a non detailed voicemail to please call back and ask for any available triage nurse @ [phone].     Kaia MONIQUE RN   [facility] Triage

## 2022-01-17 RX ORDER — DESVENLAFAXINE 50 MG/1
TABLET, FILM COATED, EXTENDED RELEASE ORAL
Qty: 90 TABLET | Refills: 0 | Status: SHIPPED | OUTPATIENT
Start: 2022-01-17 | End: 2022-01-27

## 2022-01-17 NOTE — TELEPHONE ENCOUNTER
Routing refill request to provider for review/approval because:  Drug interaction warning/lactatin warning. Also patient due for follow up.     Will route to  to contact patient for follow up. Suha Muhammad R.N.

## 2022-01-18 NOTE — TELEPHONE ENCOUNTER
Attempt # 2    Called # [phone] - Left a non detailed VM to call back at [phone] and ask for any available Triage Nurse.    Darline Kaur RN  [facility]

## 2022-01-18 NOTE — TELEPHONE ENCOUNTER
Pt called back. Adv of message below. Scheduled appt- pt preferred to schedule w/ Jyoti Alcantara    Next 5 appointments (look out 90 days)    Feb 17, 2022  2:20 PM  (Arrive by 2:00 PM)  Provider Visit with Jyoti Alcantara PA-C  [facility] [place] [facility] [address] [place] [address] [id number] [phone]        Sharyn GRANT RN

## 2022-01-24 ENCOUNTER — TELEPHONE (OUTPATIENT)
Dept: FAMILY MEDICINE | Facility: CLINIC | Age: 37
End: 2022-01-24
Payer: COMMERCIAL

## 2022-01-24 NOTE — TELEPHONE ENCOUNTER
Reason for call:  Patient reporting a symptom    Symptom or request: Patient is having headaches, thinks its from the fluoxatine.  [facility] [place]  Please call patient    Duration (how long have symptoms been present): 3 days    Have you been treated for this before? No    Additional comments:    Phone Number patient can be reached at:  Cell number on file:    Telephone Information:   Mobile [phone]       Best Time:  today    Can we leave a detailed message on this number:  YES    Call taken on 1/24/2022 at 2:02 PM by Tiana Sibley

## 2022-01-24 NOTE — TELEPHONE ENCOUNTER
"It would be okay to discontinue fluoxetine and continue just the Pristiq. Buspar would be another option to augment Pristiq if she still feels like she is having more anxiety. Buspar is also in the \"use as directed\" column on her [email or web address] testing.     José Pablo, DO  1/24/2022 5:04 PM    "

## 2022-01-26 NOTE — TELEPHONE ENCOUNTER
Called # [phone]     Pt called, noted she wanted to discontinue the Pristiq and continue the Fluoxetine. Pt was having HA with the Pristiq. HA has resolved since stopping couple days ago. Pt noted does have an appt with Jyoti Alcantara upcoming in 1 days.   Future Appointments   Date Time Provider Department Center   1/27/2022 10:40 AM Jyoti Alcantara PA-C [facility]   2/17/2022  2:20 PM Jyoti Alcantara PA-C [facility]         Shawn Mcbride RN   [facility]

## 2022-01-27 ENCOUNTER — VIRTUAL VISIT (OUTPATIENT)
Dept: FAMILY MEDICINE | Facility: CLINIC | Age: 37
End: 2022-01-27
Payer: COMMERCIAL

## 2022-01-27 DIAGNOSIS — F41.1 GENERALIZED ANXIETY DISORDER: Primary | ICD-10-CM

## 2022-01-27 PROCEDURE — 96127 BRIEF EMOTIONAL/BEHAV ASSMT: CPT | Mod: 95 | Performed by: PHYSICIAN ASSISTANT

## 2022-01-27 PROCEDURE — 99214 OFFICE O/P EST MOD 30 MIN: CPT | Mod: TEL | Performed by: PHYSICIAN ASSISTANT

## 2022-01-27 RX ORDER — SERTRALINE HYDROCHLORIDE 100 MG/1
1 TABLET, FILM COATED ORAL DAILY
COMMUNITY
Start: 2021-02-10 | End: 2022-01-27

## 2022-01-27 RX ORDER — BUPROPION HYDROCHLORIDE 150 MG/1
150 TABLET ORAL EVERY MORNING
Qty: 90 TABLET | Refills: 0 | Status: SHIPPED | OUTPATIENT
Start: 2022-01-27 | End: 2022-02-24 | Stop reason: SINTOL

## 2022-01-27 ASSESSMENT — PATIENT HEALTH QUESTIONNAIRE - PHQ9
5. POOR APPETITE OR OVEREATING: SEVERAL DAYS
SUM OF ALL RESPONSES TO PHQ QUESTIONS 1-9: 2

## 2022-01-27 ASSESSMENT — ANXIETY QUESTIONNAIRES
1. FEELING NERVOUS, ANXIOUS, OR ON EDGE: NEARLY EVERY DAY
GAD7 TOTAL SCORE: 10
5. BEING SO RESTLESS THAT IT IS HARD TO SIT STILL: SEVERAL DAYS
7. FEELING AFRAID AS IF SOMETHING AWFUL MIGHT HAPPEN: NOT AT ALL
2. NOT BEING ABLE TO STOP OR CONTROL WORRYING: SEVERAL DAYS
6. BECOMING EASILY ANNOYED OR IRRITABLE: NEARLY EVERY DAY
3. WORRYING TOO MUCH ABOUT DIFFERENT THINGS: SEVERAL DAYS
IF YOU CHECKED OFF ANY PROBLEMS ON THIS QUESTIONNAIRE, HOW DIFFICULT HAVE THESE PROBLEMS MADE IT FOR YOU TO DO YOUR WORK, TAKE CARE OF THINGS AT HOME, OR GET ALONG WITH OTHER PEOPLE: SOMEWHAT DIFFICULT

## 2022-01-27 NOTE — PROGRESS NOTES
"Maya is a 36 year old who is being evaluated via a billable telephone visit.      What phone number would you like to be contacted at? [phone]  How would you like to obtain your AV[facility]? [facility]    Assessment & Plan     Generalized anxiety disorder  Lengthy discussion regarding chain of events that occurred since GeneSight testing.  Patient appears to be doing okay and headaches improving on fluoxetine as monotherapy.  Continue fluoxetine 10 mg for the next few days and if headaches continue to stay away would recommend the addition of bupropion 150 mg that was in her green \"use as directed \"column.  I have asked the patient to send me an update on Monday via [employer] regarding her symptoms.  If headaches persist will determine an alternative course of action.  - buPROPion (WELLBUTRIN XL) 150 MG 24 hr tablet  Dispense: 90 tablet; Refill: 0    36 minutes spent on the date of the encounter doing chart review, history and exam, documentation and further activities per the note    Return in about 4 weeks (around 2/24/2022) for Video visit, Medication recheck.    Jyoti Alcantara PA-C  [facility]   Maya is a 36 year old who presents for the following health issues     HPI     Anxiety Follow-Up    Based on GeneSight results patient was switched from fluoxetine 20 mg and started on desvenlafaxine 50 mg 12/22/2021.  Contacted the clinic on 1/10/2022 and noted increased anxiety.  Requested to restart fluoxetine concurrently with desvenlafaxine.    On 1/24/2022 she contacted the clinic and stated that the combination of venlafaxine and fluoxetine caused her to have daily headaches.  She requested discontinuation of the desvenlafaxine and continuing fluoxetine 10 mg as monotherapy.    Since 1/24/2022 her headaches are slowly improving and today, 1/27/2022 marks one of the first days without a headache at all.    Stopped pristiq    How are you doing with your anxiety since " your last visit? Worsened     Are you having other symptoms that might be associated with anxiety? Yes:  short temper     Have you had a significant life event? No     Are you feeling depressed? Yes:       Do you have any concerns with your use of alcohol or other drugs? No    Social History     Tobacco Use     Smoking status: Never Smoker     Smokeless tobacco: Never Used   Substance Use Topics     Alcohol use: No     Comment: rare     Drug use: No     NIYAH-7 SCORE 7/6/2021 12/9/2021 1/27/2022   Total Score - 9 (mild anxiety) -   Total Score 11 9 10     PHQ 7/6/2021 12/9/2021 1/27/2022   PHQ-9 Total Score 0 2 2   Q9: Thoughts of better off dead/self-harm past 2 weeks Not at all Not at all Not at all     Last PHQ-9 1/27/2022   1.  Little interest or pleasure in doing things 0   2.  Feeling down, depressed, or hopeless 1   3.  Trouble falling or staying asleep, or sleeping too much 0   4.  Feeling tired or having little energy 1   5.  Poor appetite or overeating 0   6.  Feeling bad about yourself 0   7.  Trouble concentrating 0   8.  Moving slowly or restless 0   Q9: Thoughts of better off dead/self-harm past 2 weeks 0   PHQ-9 Total Score 2   Difficulty at work, home, or with people Somewhat difficult     NIYAH-7  1/27/2022   1. Feeling nervous, anxious, or on edge 3   2. Not being able to stop or control worrying 1   3. Worrying too much about different things 1   4. Trouble relaxing 1   5. Being so restless that it is hard to sit still 1   6. Becoming easily annoyed or irritable 3   7. Feeling afraid, as if something awful might happen 0   NIYAH-7 Total Score 10   If you checked any problems, how difficult have they made it for you to do your work, take care of things at home, or get along with other people? Somewhat difficult             Review of Systems   Constitutional, HEENT, cardiovascular, pulmonary, GI, , musculoskeletal, neuro, skin, endocrine and psych systems are negative, except as otherwise noted.       Objective           Vitals:  No vitals were obtained today due to virtual visit.    Physical Exam   healthy, alert and no distress  PSYCH: Alert and oriented times 3; coherent speech, normal   rate and volume, able to articulate logical thoughts, able   to abstract reason, no tangential thoughts, no hallucinations   or delusions  Her affect is normal  RESP: No cough, no audible wheezing, able to talk in full sentences  Remainder of exam unable to be completed due to telephone visits                Phone call duration: 11:45 minutes

## 2022-01-28 ASSESSMENT — ANXIETY QUESTIONNAIRES: GAD7 TOTAL SCORE: 10

## 2022-02-01 DIAGNOSIS — F41.1 GENERALIZED ANXIETY DISORDER: ICD-10-CM

## 2022-02-02 RX ORDER — FLUOXETINE 10 MG/1
10 CAPSULE ORAL DAILY
Qty: 30 CAPSULE | Refills: 0 | Status: SHIPPED | OUTPATIENT
Start: 2022-02-02 | End: 2022-03-29

## 2022-02-24 ENCOUNTER — TELEPHONE (OUTPATIENT)
Dept: NURSING | Facility: CLINIC | Age: 37
End: 2022-02-24
Payer: COMMERCIAL

## 2022-02-24 ENCOUNTER — TELEPHONE (OUTPATIENT)
Dept: FAMILY MEDICINE | Facility: CLINIC | Age: 37
End: 2022-02-24
Payer: COMMERCIAL

## 2022-02-24 ASSESSMENT — ANXIETY QUESTIONNAIRES
5. BEING SO RESTLESS THAT IT IS HARD TO SIT STILL: NOT AT ALL
2. NOT BEING ABLE TO STOP OR CONTROL WORRYING: SEVERAL DAYS
6. BECOMING EASILY ANNOYED OR IRRITABLE: NEARLY EVERY DAY
7. FEELING AFRAID AS IF SOMETHING AWFUL MIGHT HAPPEN: NOT AT ALL
GAD7 TOTAL SCORE: 9
1. FEELING NERVOUS, ANXIOUS, OR ON EDGE: NEARLY EVERY DAY
3. WORRYING TOO MUCH ABOUT DIFFERENT THINGS: SEVERAL DAYS

## 2022-02-24 ASSESSMENT — PATIENT HEALTH QUESTIONNAIRE - PHQ9
SUM OF ALL RESPONSES TO PHQ QUESTIONS 1-9: 6
5. POOR APPETITE OR OVEREATING: SEVERAL DAYS

## 2022-02-24 NOTE — TELEPHONE ENCOUNTER
Voice mail full, cannot leave a message, did leave a mychart message to make an appointment to discuss med changes   Please schedule follow up when patient calls back.  (see previous notes for details)    Thanks Camille

## 2022-02-24 NOTE — TELEPHONE ENCOUNTER
"Routed to Jyoti Alcantara  Situation:  Patient calling and is requesting to stop the Wellbutrin.   She would like to start another medication to take with her Prozac.    Background:  Depression    Assessment:  PHQ-9 score 2/24/22= 6  GAd7 score 2/24/22 =9  1. Patient stated \"for the last 2 weeks she is feeling  more depressed agitated and gloomy.\"  2. \"I don't feel like myself\"  3. \"I feel tired in the morning after taking the Wellbutrin\"  4. \" I Feel worse not than I have in a while.\"  5. Reports lack of patience  6. reports getting \"good rest at night'     Recommendation:  1. Will route request onto provider for medication recommendation and someone will get back to her with a response.     2. instructed patient to Call back if worsening depression symptoms, you have throught's of harming yourself or others.      Patient denied any other questions or concerns and agrees with plan.     Kaia MONIQUE RN   [facility] Triage           "

## 2022-02-24 NOTE — TELEPHONE ENCOUNTER
Pt needs OV (was scheduled on 2/17 for med check but had to cancel due to me being out of office).  Please reschedule with me or her PCP Dr. Pablo- can use same day/hosp follow up spot.

## 2022-02-24 NOTE — TELEPHONE ENCOUNTER
Advised patient of Jyoti's message below. She will stop taking now. Will continue her fluoxetine and keep appointment on 3/1.     Darline Kaur RN  [facility]

## 2022-02-24 NOTE — TELEPHONE ENCOUNTER
Patient calling with medication request;    She states she began taking bupropion approx 2 weeks ago.  Reports that she feels is having 'bad side effects from this medication; 'feels awful, irritable, super tired, and depressed since starting it.'  She says she is not feeling suicidal and 'is in control but just not liking feeling like this'  Patient has made an appt to see Jyoti Alcantara PA-C but this is not for another week and a half and patient is wondering what she should do about medication in the meantime?  She is wondering if there is another medication that she could try.    Message routed to Jyoti Alcantara PA-C and care team to advise patient via [employer] or at [phone].  Per patient,, a detailed message can be left on this confidential number.    Kenya Bolanos RN, Nurse Advisor 3:11 PM 2/24/2022  COVID 19 Nurse Triage Plan/Patient Instructions    Please be aware that novel coronavirus (COVID-19) may be circulating in the community. If you develop symptoms such as fever, cough, or SOB or if you have concerns about the presence of another infection including coronavirus (COVID-19), please contact your health care provider or visit [email or web address].     Disposition/Instructions    Home care recommended. Follow home care protocol based instructions.    Thank you for taking steps to prevent the spread of this virus.  o Limit your contact with others.  o Wear a simple mask to cover your cough.  o Wash your hands well and often.

## 2022-02-24 NOTE — TELEPHONE ENCOUNTER
Patient can discontinue Wellbutrin immediately.  No need to taper/wean.  I would not like her to start anything new before the symptoms dissipate.  She should continue her fluoxetine in the meantime      Jyoti Alcantara MBA, MS, PA-C

## 2022-02-25 ASSESSMENT — ANXIETY QUESTIONNAIRES: GAD7 TOTAL SCORE: 9

## 2022-03-01 ENCOUNTER — MYC MEDICAL ADVICE (OUTPATIENT)
Dept: FAMILY MEDICINE | Facility: CLINIC | Age: 37
End: 2022-03-01

## 2022-03-01 NOTE — TELEPHONE ENCOUNTER
Please reschedule pt.  Also, please see if there is an alternate 2nd # as it appears that the # we have on file may be a landline.  We have had issues with the VM box being full      Jyoti Alcantara MBA, MS, PA-C

## 2022-03-02 NOTE — TELEPHONE ENCOUNTER
Patient scheduled for tomorrow 3/3/22 for virtual appointment. Verified number and she only has the one contact number.     Mark Quiroz

## 2022-03-03 ENCOUNTER — VIRTUAL VISIT (OUTPATIENT)
Dept: FAMILY MEDICINE | Facility: CLINIC | Age: 37
End: 2022-03-03
Payer: COMMERCIAL

## 2022-03-03 DIAGNOSIS — F41.1 GENERALIZED ANXIETY DISORDER: Primary | ICD-10-CM

## 2022-03-03 PROCEDURE — 99213 OFFICE O/P EST LOW 20 MIN: CPT | Mod: TEL | Performed by: PHYSICIAN ASSISTANT

## 2022-03-03 NOTE — PROGRESS NOTES
Kasey is a 36 year old who is being evaluated via a billable telephone visit.      What phone number would you like to be contacted at? [phone]  How would you like to obtain your AVS? MyChart    Assessment & Plan     Generalized anxiety disorder  Suboptimally controlled.  I would not like to start a new medication at this time as she has not returned to her baseline since discontinuing the bupropion.  I would like to give her a few more weeks to see if this normalizes.  In the meantime, she will continue fluoxetine 10 mg once daily.  Based on her GeneSight results, my plan would be to add a lower dose duloxetine (20 mg once daily x1 week then increasing to 20 mg twice daily).  If she does not return to her baseline we discussed briefly doing an E consultation with psychiatry.                 Return in about 2 weeks (around 3/17/2022) for [facility] update on symptoms.    Jyoti Alcantara PA-C  [facility]   Kasey is a 36 year old who presents for the following health issues .    HPI     Depression and Anxiety Follow-Up  On fluoxetine 10 mg and buproprion 150 mg (added buproprion 150 mg 1/27/2022) - stopped 2/24/2022      Last visit 1/27/2022.  She had discontinued desvenlafaxine due to headaches.  She was on fluoxetine 10 mg for monotherapy at that time.  The date of our visit was the first day she had not had a headache so we advised addition of bupropion (lies in her green column).      Unfortunately, when she did start this in early February almost immediately developed a sensation of being depressed, tired, and irritable.  She states that her typical issue is anxiety but depression seem to predominate.  She stopped bupropion 2/24/2022 and while improving, has not returned to which she would consider her baseline.  No external stressors are appreciated.  As of today, she continues on fluoxetine 10 mg as monotherapy.    Historical medications:  Desvenlafaxine  -headaches    How are you doing with your depression since your last visit? Worsened -depressed and irritable    How are you doing with your anxiety since your last visit?  Worsened -regarding depression    Are you having other symptoms that might be associated with depression or anxiety? No    Have you had a significant life event? No     Do you have any concerns with your use of alcohol or other drugs? No    Social History     Tobacco Use     Smoking status: Never Smoker     Smokeless tobacco: Never Used   Substance Use Topics     Alcohol use: No     Comment: rare     Drug use: No     PHQ 1/27/2022 2/24/2022 3/1/2022   PHQ-9 Total Score 2 6 9   Q9: Thoughts of better off dead/self-harm past 2 weeks Not at all Not at all Not at all     NIYAH-7 SCORE 1/27/2022 2/24/2022 3/1/2022   Total Score - - 7 (mild anxiety)   Total Score 10 9 7     Last PHQ-9 3/1/2022   1.  Little interest or pleasure in doing things 1   2.  Feeling down, depressed, or hopeless 3   3.  Trouble falling or staying asleep, or sleeping too much 1   4.  Feeling tired or having little energy 3   5.  Poor appetite or overeating 0   6.  Feeling bad about yourself 1   7.  Trouble concentrating 0   8.  Moving slowly or restless 0   Q9: Thoughts of better off dead/self-harm past 2 weeks 0   PHQ-9 Total Score 9   Difficulty at work, home, or with people -     NIYAH-7  3/1/2022   1. Feeling nervous, anxious, or on edge 1   2. Not being able to stop or control worrying 1   3. Worrying too much about different things 1   4. Trouble relaxing 1   5. Being so restless that it is hard to sit still 0   6. Becoming easily annoyed or irritable 3   7. Feeling afraid, as if something awful might happen 0   NIYAH-7 Total Score 7   If you checked any problems, how difficult have they made it for you to do your work, take care of things at home, or get along with other people? -       Suicide Assessment Five-step Evaluation and Treatment (SAFE-T)        Review of Systems    Constitutional, HEENT, cardiovascular, pulmonary, GI, , musculoskeletal, neuro, skin, endocrine and psych systems are negative, except as otherwise noted.      Objective           Vitals:  No vitals were obtained today due to virtual visit.    Physical Exam   healthy, alert and no distress  PSYCH: Alert and oriented times 3; coherent speech, normal   rate and volume, able to articulate logical thoughts, able   to abstract reason, no tangential thoughts, no hallucinations   or delusions  Her affect is normal  RESP: No cough, no audible wheezing, able to talk in full sentences  Remainder of exam unable to be completed due to telephone visits                Phone call duration: 9 minutes

## 2022-03-07 ENCOUNTER — MYC MEDICAL ADVICE (OUTPATIENT)
Dept: FAMILY MEDICINE | Facility: CLINIC | Age: 37
End: 2022-03-07
Payer: COMMERCIAL

## 2022-03-07 DIAGNOSIS — F41.1 GENERALIZED ANXIETY DISORDER: Primary | ICD-10-CM

## 2022-03-07 DIAGNOSIS — R45.4 IRRITABILITY: ICD-10-CM

## 2022-03-19 ASSESSMENT — ANXIETY QUESTIONNAIRES
GAD7 TOTAL SCORE: 2
7. FEELING AFRAID AS IF SOMETHING AWFUL MIGHT HAPPEN: NOT AT ALL
3. WORRYING TOO MUCH ABOUT DIFFERENT THINGS: NOT AT ALL
6. BECOMING EASILY ANNOYED OR IRRITABLE: MORE THAN HALF THE DAYS
GAD7 TOTAL SCORE: 2
4. TROUBLE RELAXING: NOT AT ALL
2. NOT BEING ABLE TO STOP OR CONTROL WORRYING: NOT AT ALL
1. FEELING NERVOUS, ANXIOUS, OR ON EDGE: NOT AT ALL
5. BEING SO RESTLESS THAT IT IS HARD TO SIT STILL: NOT AT ALL
GAD7 TOTAL SCORE: 2
7. FEELING AFRAID AS IF SOMETHING AWFUL MIGHT HAPPEN: NOT AT ALL

## 2022-03-19 ASSESSMENT — PATIENT HEALTH QUESTIONNAIRE - PHQ9
SUM OF ALL RESPONSES TO PHQ QUESTIONS 1-9: 2
SUM OF ALL RESPONSES TO PHQ QUESTIONS 1-9: 2
10. IF YOU CHECKED OFF ANY PROBLEMS, HOW DIFFICULT HAVE THESE PROBLEMS MADE IT FOR YOU TO DO YOUR WORK, TAKE CARE OF THINGS AT HOME, OR GET ALONG WITH OTHER PEOPLE: SOMEWHAT DIFFICULT

## 2022-03-20 ASSESSMENT — PATIENT HEALTH QUESTIONNAIRE - PHQ9: SUM OF ALL RESPONSES TO PHQ QUESTIONS 1-9: 2

## 2022-03-20 ASSESSMENT — ANXIETY QUESTIONNAIRES: GAD7 TOTAL SCORE: 2

## 2022-03-21 RX ORDER — DULOXETIN HYDROCHLORIDE 20 MG/1
CAPSULE, DELAYED RELEASE ORAL
Qty: 180 CAPSULE | Refills: 0 | Status: SHIPPED | OUTPATIENT
Start: 2022-03-21 | End: 2022-06-07

## 2022-03-28 ENCOUNTER — MYC MEDICAL ADVICE (OUTPATIENT)
Dept: FAMILY MEDICINE | Facility: CLINIC | Age: 37
End: 2022-03-28
Payer: COMMERCIAL

## 2022-03-28 DIAGNOSIS — F41.1 GENERALIZED ANXIETY DISORDER: ICD-10-CM

## 2022-03-29 RX ORDER — FLUOXETINE 10 MG/1
10 CAPSULE ORAL DAILY
Qty: 90 CAPSULE | Refills: 1 | Status: SHIPPED | OUTPATIENT
Start: 2022-03-29 | End: 2022-04-11

## 2022-03-29 NOTE — TELEPHONE ENCOUNTER
Called and spoke with patient.   Was feeling dizzy and foggy yesterday. Faded by the afternoon. Feels fine this morning, didn't take medication this morning. Patient will take it at night instead.     Darline Kaur RN  [facility]

## 2022-03-29 NOTE — TELEPHONE ENCOUNTER
Prescription approved per [facility] Refill Protocol.    Darline Kaur RN  [facility]

## 2022-03-30 NOTE — TELEPHONE ENCOUNTER
Agree with trying to take in the evening. Continue medication for another week to see if side effects fade. If still having issues after a couple weeks on the medication, would recommend different med.    José Pablo DO  3/30/2022 5:14 PM

## 2022-04-11 ENCOUNTER — MYC MEDICAL ADVICE (OUTPATIENT)
Dept: FAMILY MEDICINE | Facility: CLINIC | Age: 37
End: 2022-04-11
Payer: COMMERCIAL

## 2022-04-11 DIAGNOSIS — F41.1 GENERALIZED ANXIETY DISORDER: ICD-10-CM

## 2022-04-11 RX ORDER — FLUOXETINE 10 MG/1
10 CAPSULE ORAL DAILY
Qty: 90 CAPSULE | Refills: 1 | Status: SHIPPED | OUTPATIENT
Start: 2022-04-11 | End: 2022-06-07

## 2022-04-11 NOTE — TELEPHONE ENCOUNTER
Routing to provider to review and advise. Ok to have pharmacy fill early?    Darline Kaur RN  [facility]

## 2022-05-26 ENCOUNTER — MYC MEDICAL ADVICE (OUTPATIENT)
Dept: FAMILY MEDICINE | Facility: CLINIC | Age: 37
End: 2022-05-26
Payer: COMMERCIAL

## 2022-06-07 ENCOUNTER — VIRTUAL VISIT (OUTPATIENT)
Dept: FAMILY MEDICINE | Facility: CLINIC | Age: 37
End: 2022-06-07
Payer: COMMERCIAL

## 2022-06-07 DIAGNOSIS — F41.1 GENERALIZED ANXIETY DISORDER: Primary | ICD-10-CM

## 2022-06-07 PROCEDURE — 96127 BRIEF EMOTIONAL/BEHAV ASSMT: CPT | Mod: 95 | Performed by: PHYSICIAN ASSISTANT

## 2022-06-07 PROCEDURE — 99214 OFFICE O/P EST MOD 30 MIN: CPT | Mod: TEL | Performed by: PHYSICIAN ASSISTANT

## 2022-06-07 ASSESSMENT — ANXIETY QUESTIONNAIRES
GAD7 TOTAL SCORE: 7
1. FEELING NERVOUS, ANXIOUS, OR ON EDGE: SEVERAL DAYS
7. FEELING AFRAID AS IF SOMETHING AWFUL MIGHT HAPPEN: NOT AT ALL
5. BEING SO RESTLESS THAT IT IS HARD TO SIT STILL: NOT AT ALL
3. WORRYING TOO MUCH ABOUT DIFFERENT THINGS: SEVERAL DAYS
6. BECOMING EASILY ANNOYED OR IRRITABLE: NEARLY EVERY DAY
GAD7 TOTAL SCORE: 7
IF YOU CHECKED OFF ANY PROBLEMS ON THIS QUESTIONNAIRE, HOW DIFFICULT HAVE THESE PROBLEMS MADE IT FOR YOU TO DO YOUR WORK, TAKE CARE OF THINGS AT HOME, OR GET ALONG WITH OTHER PEOPLE: VERY DIFFICULT
2. NOT BEING ABLE TO STOP OR CONTROL WORRYING: SEVERAL DAYS

## 2022-06-07 ASSESSMENT — PATIENT HEALTH QUESTIONNAIRE - PHQ9
5. POOR APPETITE OR OVEREATING: SEVERAL DAYS
SUM OF ALL RESPONSES TO PHQ QUESTIONS 1-9: 5

## 2022-06-07 NOTE — PROGRESS NOTES
"Kasey is a 36 year old who is being evaluated via a billable telephone visit.      What phone number would you like to be contacted at? [phone]  How would you like to obtain your AVS? [employer]    Assessment & Plan     Generalized anxiety disorder  Suboptimally controlled.  Discontinued herself approximately 3 weeks ago from duloxetine due to mental fogginess.  Continues on fluoxetine 10 mg.  Did okay when on duloxetine 20 mg previously (despite not being an ideal match per her GeneSight report).  Would like to try this to see if this helps her irritability.  If no significant improvement plan to do E consultation with psychiatry.  Did briefly discussed that the addition of a mood stabilizer such as Abilify may be beneficial.  Patient voiced understanding and agreement.  - FLUoxetine (PROZAC) 20 MG capsule  Dispense: 90 capsule; Refill: 0           Return in about 16 days (around 6/23/2022) for Phone visit.    Jyoti Alcantara PA-C  [facility]   Kasey is a 36 year old who presents for the following health issues     HPI     Depression and Anxiety Follow-Up  Notes feeling irritable/crabby/short tempered and \"not feeling like herself \".      Patient has completed GeneSight testing    Discontinued bupropion due to fatigue/depression/irritability which improved with discontinuation and continuation of fluoxetine 10 mg as monotherapy 3/7/2022.  She reports that she still felt irritable so we did a trial of Cymbalta 20 mg 3/26/2022 with a goal of 20 mg twice daily.  My intent was for her to discontinue the fluoxetine when starting the duloxetine, however, she continued the 10 mg once daily.  She sent a [employer] message on 3/28/2022 and reported feeling dizzy/foggy and one of my partners advised to take this medication in the evening. Tried for 2 weeks but ultimately discontinued.  The dizziness/fogginess has gone away but she states that her irritability is persistent and she " does not like the way that she reacts to situations and is parenting.    How are you doing with your depression since your last visit? Worsened     How are you doing with your anxiety since your last visit?  No change    Are you having other symptoms that might be associated with depression or anxiety? No    Have you had a significant life event? No     Do you have any concerns with your use of alcohol or other drugs? No    Historical medications:  Desvenlafaxine -headaches  Duloxetine: fogginess  Bupropion - fatigue, depression, irritability -    Social History     Tobacco Use     Smoking status: Never Smoker     Smokeless tobacco: Never Used   Substance Use Topics     Alcohol use: No     Comment: rare     Drug use: No     PHQ 3/1/2022 3/19/2022 6/7/2022   PHQ-9 Total Score 9 2 5   Q9: Thoughts of better off dead/self-harm past 2 weeks Not at all Not at all Not at all     NIYAH-7 SCORE 3/1/2022 3/19/2022 6/7/2022   Total Score 7 (mild anxiety) 2 (minimal anxiety) -   Total Score 7 2 7     Last PHQ-9 6/7/2022   1.  Little interest or pleasure in doing things 1   2.  Feeling down, depressed, or hopeless 1   3.  Trouble falling or staying asleep, or sleeping too much 0   4.  Feeling tired or having little energy 1   5.  Poor appetite or overeating 1   6.  Feeling bad about yourself 0   7.  Trouble concentrating 1   8.  Moving slowly or restless 0   Q9: Thoughts of better off dead/self-harm past 2 weeks 0   PHQ-9 Total Score 5   Difficulty at work, home, or with people Very difficult     NIYAH-7  6/7/2022   1. Feeling nervous, anxious, or on edge 1   2. Not being able to stop or control worrying 1   3. Worrying too much about different things 1   4. Trouble relaxing 1   5. Being so restless that it is hard to sit still 0   6. Becoming easily annoyed or irritable 3   7. Feeling afraid, as if something awful might happen 0   NIYAH-7 Total Score 7   If you checked any problems, how difficult have they made it for you to do your  work, take care of things at home, or get along with other people? Very difficult       Suicide Assessment Five-step Evaluation and Treatment (SAFE-T)          Review of Systems   Constitutional, HEENT, cardiovascular, pulmonary, GI, , musculoskeletal, neuro, skin, endocrine and psych systems are negative, except as otherwise noted.      Objective           Vitals:  No vitals were obtained today due to virtual visit.    Physical Exam   healthy, alert and no distress  PSYCH: Alert and oriented times 3; coherent speech, normal   rate and volume, able to articulate logical thoughts, able   to abstract reason, no tangential thoughts, no hallucinations   or delusions  Her affect is normal  RESP: No cough, no audible wheezing, able to talk in full sentences  Remainder of exam unable to be completed due to telephone visits                Phone call duration: 13 minutes

## 2022-07-18 ENCOUNTER — MYC MEDICAL ADVICE (OUTPATIENT)
Dept: FAMILY MEDICINE | Facility: CLINIC | Age: 37
End: 2022-07-18

## 2022-07-26 NOTE — LETTER
[facility]  [address] [place] [address]  [phone]                    July 7, 2021    [address] [facility] [id number]      Dear Maya,    Here is a summary of your recent test results:    -Cholesterol levels (LDL,HDL, Triglycerides) are normal.    ADVISE: rechecking this in 1 year.     -Kidney function is normal (Cr, GFR), Sodium is normal, Potassium is normal, Calcium is normal, Glucose is normal.   -Hepatitis C antibody screen test shows no signs of a previous hepatitis C infection.     Your test results are enclosed.      Please contact me if you have any questions.  Thank you very much for trusting [facility].     Healthy regards,    Dr. José Pablo, DO           Results for orders placed or performed in visit on 07/06/21   Hepatitis C Screen Reflex to HCV RNA Quant and Genotype     Status: None   Result Value Ref Range    Hepatitis C Antibody Nonreactive NR^Nonreactive   Basic metabolic panel  (Ca, Cl, CO2, Creat, Gluc, K, Na, BUN)     Status: None   Result Value Ref Range    Sodium 138 133 - 144 mmol/L    Potassium 4.0 3.4 - 5.3 mmol/L    Chloride 104 94 - 109 mmol/L    Carbon Dioxide 30 20 - 32 mmol/L    Anion Gap 4 3 - 14 mmol/L    Glucose 77 70 - 99 mg/dL    Urea Nitrogen 13 7 - 30 mg/dL    Creatinine 0.58 0.52 - 1.04 mg/dL    GFR Estimate >90 >60 mL/min/[1.73_m2]    GFR Estimate If Black >90 >60 mL/min/[1.73_m2]    Calcium 8.6 8.5 - 10.1 mg/dL   Lipid panel reflex to direct LDL Fasting     Status: None   Result Value Ref Range    Cholesterol 194 <200 mg/dL    Triglycerides 89 <150 mg/dL    HDL Cholesterol 98 >49 mg/dL    LDL Cholesterol Calculated 78 <100 mg/dL    Non HDL Cholesterol 96 <130 mg/dL      Problem: Pain  Goal: Verbalizes/displays adequate comfort level or baseline comfort level  7/26/2022 1845 by Misbah Sy RN  Outcome: Progressing Towards Goal  7/26/2022 1414 by Misbah Sy RN  Outcome: Progressing Towards Goal  Flowsheets  Taken 7/26/2022 1028 by Misbah Sy RN  Verbalizes/displays adequate comfort level or baseline comfort level:   Encourage patient to monitor pain and request assistance   Assess pain using appropriate pain scale   Administer analgesics based on type and severity of pain and evaluate response   Implement non-pharmacological measures as appropriate and evaluate response   Consider cultural and social influences on pain and pain management   Notify Licensed Independent Practitioner if interventions unsuccessful or patient reports new pain  Taken 7/26/2022 0638 by Yang Jones RN  Verbalizes/displays adequate comfort level or baseline comfort level:   Encourage patient to monitor pain and request assistance   Assess pain using appropriate pain scale   Administer analgesics based on type and severity of pain and evaluate response   Implement non-pharmacological measures as appropriate and evaluate response

## 2022-08-11 ENCOUNTER — VIRTUAL VISIT (OUTPATIENT)
Dept: FAMILY MEDICINE | Facility: CLINIC | Age: 37
End: 2022-08-11
Payer: COMMERCIAL

## 2022-08-11 DIAGNOSIS — R45.4 IRRITABILITY: Primary | ICD-10-CM

## 2022-08-11 DIAGNOSIS — F41.1 GENERALIZED ANXIETY DISORDER: ICD-10-CM

## 2022-08-11 PROCEDURE — 99213 OFFICE O/P EST LOW 20 MIN: CPT | Mod: TEL | Performed by: PHYSICIAN ASSISTANT

## 2022-08-11 RX ORDER — ARIPIPRAZOLE 5 MG/1
2.5 TABLET ORAL DAILY
Qty: 45 TABLET | Refills: 0 | Status: SHIPPED | OUTPATIENT
Start: 2022-08-11 | End: 2022-09-13

## 2022-08-11 ASSESSMENT — ANXIETY QUESTIONNAIRES
GAD7 TOTAL SCORE: 5
GAD7 TOTAL SCORE: 5
5. BEING SO RESTLESS THAT IT IS HARD TO SIT STILL: NOT AT ALL
3. WORRYING TOO MUCH ABOUT DIFFERENT THINGS: NOT AT ALL
6. BECOMING EASILY ANNOYED OR IRRITABLE: NEARLY EVERY DAY
7. FEELING AFRAID AS IF SOMETHING AWFUL MIGHT HAPPEN: NOT AT ALL
8. IF YOU CHECKED OFF ANY PROBLEMS, HOW DIFFICULT HAVE THESE MADE IT FOR YOU TO DO YOUR WORK, TAKE CARE OF THINGS AT HOME, OR GET ALONG WITH OTHER PEOPLE?: VERY DIFFICULT
4. TROUBLE RELAXING: SEVERAL DAYS
2. NOT BEING ABLE TO STOP OR CONTROL WORRYING: NOT AT ALL
IF YOU CHECKED OFF ANY PROBLEMS ON THIS QUESTIONNAIRE, HOW DIFFICULT HAVE THESE PROBLEMS MADE IT FOR YOU TO DO YOUR WORK, TAKE CARE OF THINGS AT HOME, OR GET ALONG WITH OTHER PEOPLE: VERY DIFFICULT
7. FEELING AFRAID AS IF SOMETHING AWFUL MIGHT HAPPEN: NOT AT ALL
GAD7 TOTAL SCORE: 5
1. FEELING NERVOUS, ANXIOUS, OR ON EDGE: SEVERAL DAYS

## 2022-08-11 ASSESSMENT — PATIENT HEALTH QUESTIONNAIRE - PHQ9
SUM OF ALL RESPONSES TO PHQ QUESTIONS 1-9: 1
10. IF YOU CHECKED OFF ANY PROBLEMS, HOW DIFFICULT HAVE THESE PROBLEMS MADE IT FOR YOU TO DO YOUR WORK, TAKE CARE OF THINGS AT HOME, OR GET ALONG WITH OTHER PEOPLE: SOMEWHAT DIFFICULT
SUM OF ALL RESPONSES TO PHQ QUESTIONS 1-9: 1

## 2022-08-11 NOTE — PROGRESS NOTES
Kasey is a 37 year old who is being evaluated via a billable telephone visit.      What phone number would you like to be contacted at? [phone]  How would you like to obtain your AVS? #waywire    Assessment & Plan     Irritability  Generalized anxiety disorder  Suboptimally controlled.  Continue fluoxetine 20 mg and add Abilify 2.5 mg once daily.  Recommend virtual visit in 4 weeks to see if correct medication and dosing.  If any adverse effects in the interim patient will keep me apprised.  Verbal contract for safety.  - ARIPiprazole (ABILIFY) 5 MG tablet  Dispense: 45 tablet; Refill: 0      Return in about 4 weeks (around 9/8/2022) for Med check (OK for virtual) .    Jyoti Alcantara PA-C  [facility]   Kasey is a 37 year old, presenting for the following health issues:  Recheck Medication      History of Present Illness     Note from 6/7/2022  Patient has completed Skimbl testing     Discontinued bupropion due to fatigue/depression/irritability which improved with discontinuation and continuation of fluoxetine 10 mg as monotherapy 3/7/2022.  She reports that she still felt irritable so we did a trial of Cymbalta 20 mg 3/26/2022 with a goal of 20 mg twice daily.  My intent was for her to discontinue the fluoxetine when starting the duloxetine, however, she continued the 10 mg once daily.  She sent a #waywire message on 3/28/2022 and reported feeling dizzy/foggy and one of my partners advised to take this medication in the evening. Tried for 2 weeks but ultimately discontinued.  The dizziness/fogginess has gone away but she states that her irritability is persistent and she does not like the way that she reacts to situations and is parenting.    Last OV 6/7/2022 we increased fluoxetine to 20 mg.  Overall depression is ok but irritability persists.  We had discussed possibly adding Abilify if not at goal.  Patient would like to try this medication addition.      Mental  Health Follow-up:  Patient presents to follow-up on Depression & Anxiety.Patient's depression since last visit has been:  Worse  The patient is having other symptoms associated with depression.  Patient's anxiety since last visit has been:  Medium  The patient is not having other symptoms associated with anxiety.  Any significant life events: No  Patient is not feeling anxious or having panic attacks.  Patient has no concerns about alcohol or drug use.    She eats 2-3 servings of fruits and vegetables daily.She consumes 2 sweetened beverage(s) daily.She exercises with enough effort to increase her heart rate 20 to 29 minutes per day.  She exercises with enough effort to increase her heart rate 5 days per week.   She is taking medications regularly.    Today's PHQ-9         PHQ-9 Total Score: 1    PHQ-9 Q9 Thoughts of better off dead/self-harm past 2 weeks :   Not at all    How difficult have these problems made it for you to do your work, take care of things at home, or get along with other people: Somewhat difficult  Today's NIYAH-7 Score: 5       Social History     Tobacco Use     Smoking status: Never Smoker     Smokeless tobacco: Never Used   Substance Use Topics     Alcohol use: No     Comment: rare     Drug use: No     PHQ 6/7/2022 7/26/2022 8/11/2022   PHQ-9 Total Score 5 1 1   Q9: Thoughts of better off dead/self-harm past 2 weeks Not at all Not at all Not at all     NIYAH-7 SCORE 6/7/2022 7/26/2022 8/11/2022   Total Score - 6 (mild anxiety) 5 (mild anxiety)   Total Score 7 6 5     Last PHQ-9 8/11/2022   1.  Little interest or pleasure in doing things 0   2.  Feeling down, depressed, or hopeless 1   3.  Trouble falling or staying asleep, or sleeping too much 0   4.  Feeling tired or having little energy 0   5.  Poor appetite or overeating 0   6.  Feeling bad about yourself 0   7.  Trouble concentrating 0   8.  Moving slowly or restless 0   Q9: Thoughts of better off dead/self-harm past 2 weeks 0   PHQ-9 Total  Score 1   Difficulty at work, home, or with people -     NIYAH-7  8/11/2022   1. Feeling nervous, anxious, or on edge 1   2. Not being able to stop or control worrying 0   3. Worrying too much about different things 0   4. Trouble relaxing 1   5. Being so restless that it is hard to sit still 0   6. Becoming easily annoyed or irritable 3   7. Feeling afraid, as if something awful might happen 0   NIYAH-7 Total Score 5   If you checked any problems, how difficult have they made it for you to do your work, take care of things at home, or get along with other people? Very difficult       Suicide Assessment Five-step Evaluation and Treatment (SAFE-T)    Review of Systems   Constitutional, HEENT, cardiovascular, pulmonary, GI, , musculoskeletal, neuro, skin, endocrine and psych systems are negative, except as otherwise noted.      Objective            Vitals:  No vitals were obtained today due to virtual visit.    Physical Exam   healthy, alert and no distress  PSYCH: Alert and oriented times 3; coherent speech, normal   rate and volume, able to articulate logical thoughts, able   to abstract reason, no tangential thoughts, no hallucinations   or delusions  Her affect is normal  RESP: No cough, no audible wheezing, able to talk in full sentences  Remainder of exam unable to be completed due to telephone visits          Phone call duration: 9 minutes    .  ..  Answers for HPI/ROS submitted by the patient on 8/11/2022  If you checked off any problems, how difficult have these problems made it for you to do your work, take care of things at home, or get along with other people?: Somewhat difficult  PHQ9 TOTAL SCORE: 1  NIYAH 7 TOTAL SCORE: 5  Depression/Anxiety: Depression & Anxiety  Status since last visit:: worse  Anxiety since last: : medium  Other associated symptoms of depression:: Yes  Other associated symotome: : No  Significant life event: : No  Anxious:: No  Current substance use:: No  How many servings of fruits and  vegetables do you eat daily?: 2-3  On average, how many sweetened beverages do you drink each day (Examples: soda, juice, sweet tea, etc.  Do NOT count diet or artificially sweetened beverages)?: 2  How many minutes a day do you exercise enough to make your heart beat faster?: 20 to 29  How many days a week do you exercise enough to make your heart beat faster?: 5  How many days per week do you miss taking your medication?: 0

## 2022-08-15 PROBLEM — Z36.89 ENCOUNTER FOR TRIAGE IN PREGNANT PATIENT: Status: RESOLVED | Noted: 2017-10-10 | Resolved: 2022-08-15

## 2022-08-15 PROBLEM — O21.1 HYPEREMESIS GRAVIDARUM WITH DEHYDRATION: Status: RESOLVED | Noted: 2019-02-26 | Resolved: 2022-08-15

## 2022-08-15 PROBLEM — R45.4 IRRITABILITY: Status: ACTIVE | Noted: 2022-08-15

## 2022-09-13 ENCOUNTER — VIRTUAL VISIT (OUTPATIENT)
Dept: FAMILY MEDICINE | Facility: CLINIC | Age: 37
End: 2022-09-13
Payer: COMMERCIAL

## 2022-09-13 DIAGNOSIS — F41.1 GENERALIZED ANXIETY DISORDER: Primary | ICD-10-CM

## 2022-09-13 DIAGNOSIS — R45.4 IRRITABILITY: ICD-10-CM

## 2022-09-13 PROCEDURE — 99213 OFFICE O/P EST LOW 20 MIN: CPT | Mod: TEL | Performed by: PHYSICIAN ASSISTANT

## 2022-09-13 PROCEDURE — 96127 BRIEF EMOTIONAL/BEHAV ASSMT: CPT | Mod: TEL | Performed by: PHYSICIAN ASSISTANT

## 2022-09-13 RX ORDER — ARIPIPRAZOLE 5 MG/1
2.5 TABLET ORAL DAILY
Qty: 45 TABLET | Refills: 1 | Status: SHIPPED | OUTPATIENT
Start: 2022-09-13

## 2022-09-13 ASSESSMENT — ANXIETY QUESTIONNAIRES
2. NOT BEING ABLE TO STOP OR CONTROL WORRYING: NOT AT ALL
GAD7 TOTAL SCORE: 2
5. BEING SO RESTLESS THAT IT IS HARD TO SIT STILL: NOT AT ALL
GAD7 TOTAL SCORE: 2
6. BECOMING EASILY ANNOYED OR IRRITABLE: SEVERAL DAYS
3. WORRYING TOO MUCH ABOUT DIFFERENT THINGS: NOT AT ALL
7. FEELING AFRAID AS IF SOMETHING AWFUL MIGHT HAPPEN: NOT AT ALL
1. FEELING NERVOUS, ANXIOUS, OR ON EDGE: SEVERAL DAYS
IF YOU CHECKED OFF ANY PROBLEMS ON THIS QUESTIONNAIRE, HOW DIFFICULT HAVE THESE PROBLEMS MADE IT FOR YOU TO DO YOUR WORK, TAKE CARE OF THINGS AT HOME, OR GET ALONG WITH OTHER PEOPLE: NOT DIFFICULT AT ALL

## 2022-09-13 ASSESSMENT — PATIENT HEALTH QUESTIONNAIRE - PHQ9
5. POOR APPETITE OR OVEREATING: NOT AT ALL
SUM OF ALL RESPONSES TO PHQ QUESTIONS 1-9: 2

## 2022-09-13 NOTE — PROGRESS NOTES
"Kasey is a 37 year old who is being evaluated via a billable telephone visit.      What phone number would you like to be contacted at? [phone]  How would you like to obtain your AVS? MyChart    Assessment & Plan     Generalized anxiety disorder  Irritability  Patient reports significant improvement in overall feeling of being \"balanced\".  Some insomnia with nighttime administration so switch to morning and overall feels much better.  Does have some grogginess during the day but feels that she is able to get through this and that it is gradually improving.  Would like to stay on this regimen for now.  Plan to follow-up in 6 months for annual visit/med check.  Patient will keep me apprised of any changes in the interim.  - FLUoxetine (PROZAC) 20 MG capsule  Dispense: 90 capsule; Refill: 1  - ARIPiprazole (ABILIFY) 5 MG tablet  Dispense: 45 tablet; Refill: 1     Return in about 6 months (around 3/13/2023) for Physical Exam, Medication recheck, Fasting labs, PAP.    Jyoti Alcantara PA-C  [facility]   Kasey is a 37 year old, presenting for the following health issues:  RECHECK (mood)      HPI     Anxiety Follow-Up  Continued fluoxetine 20 mg and added Abilify once daily 8/11/2022.  Feels very balanced.  Took about 2 weeks to noticed improvement.  Was taking at night - unable to sleep - switched to morning and this is improved (slightly groggy but overall improved.   Increased appetite.      How are you doing with your anxiety since your last visit? Improved     Are you having other symptoms that might be associated with anxiety? No    Have you had a significant life event? No     Are you feeling depressed? No    Do you have any concerns with your use of alcohol or other drugs? No    Social History     Tobacco Use     Smoking status: Never Smoker     Smokeless tobacco: Never Used   Substance Use Topics     Alcohol use: No     Comment: rare     Drug use: No     NIYAH-7 SCORE " 7/26/2022 8/11/2022 9/13/2022   Total Score 6 (mild anxiety) 5 (mild anxiety) -   Total Score 6 5 2     PHQ 7/26/2022 8/11/2022 9/13/2022   PHQ-9 Total Score 1 1 2   Q9: Thoughts of better off dead/self-harm past 2 weeks Not at all Not at all Not at all     Last PHQ-9 9/13/2022   1.  Little interest or pleasure in doing things 0   2.  Feeling down, depressed, or hopeless 0   3.  Trouble falling or staying asleep, or sleeping too much 0   4.  Feeling tired or having little energy 1   5.  Poor appetite or overeating 1   6.  Feeling bad about yourself 0   7.  Trouble concentrating 0   8.  Moving slowly or restless 0   Q9: Thoughts of better off dead/self-harm past 2 weeks 0   PHQ-9 Total Score 2   Difficulty at work, home, or with people Not difficult at all     NIYAH-7  9/13/2022   1. Feeling nervous, anxious, or on edge 1   2. Not being able to stop or control worrying 0   3. Worrying too much about different things 0   4. Trouble relaxing 0   5. Being so restless that it is hard to sit still 0   6. Becoming easily annoyed or irritable 1   7. Feeling afraid, as if something awful might happen 0   NIYAH-7 Total Score 2   If you checked any problems, how difficult have they made it for you to do your work, take care of things at home, or get along with other people? Not difficult at all         How many servings of fruits and vegetables do you eat daily?  4 or more    On average, how many sweetened beverages do you drink each day (Examples: soda, juice, sweet tea, etc.  Do NOT count diet or artificially sweetened beverages)?   2    How many days per week do you exercise enough to make your heart beat faster? 4    How many minutes a day do you exercise enough to make your heart beat faster? 30 - 60    How many days per week do you miss taking your medication? 0        Review of Systems   Constitutional, HEENT, cardiovascular, pulmonary, GI, , musculoskeletal, neuro, skin, endocrine and psych systems are negative, except  as otherwise noted.      Objective           Vitals:  No vitals were obtained today due to virtual visit.    Physical Exam   healthy, alert and no distress  PSYCH: Alert and oriented times 3; coherent speech, normal   rate and volume, able to articulate logical thoughts, able   to abstract reason, no tangential thoughts, no hallucinations   or delusions  Her affect is normal  RESP: No cough, no audible wheezing, able to talk in full sentences  Remainder of exam unable to be completed due to telephone visits                Phone call duration: 5 minutes

## 2022-10-10 ENCOUNTER — HEALTH MAINTENANCE LETTER (OUTPATIENT)
Age: 37
End: 2022-10-10

## 2023-01-10 ENCOUNTER — MYC MEDICAL ADVICE (OUTPATIENT)
Dept: FAMILY MEDICINE | Facility: CLINIC | Age: 38
End: 2023-01-10

## 2023-01-11 NOTE — TELEPHONE ENCOUNTER
Marty message sent to patient for further assessment       Thank you!  Kaia MONIQUE RN   [facility] Triage

## 2023-02-09 ENCOUNTER — MYC MEDICAL ADVICE (OUTPATIENT)
Dept: FAMILY MEDICINE | Facility: CLINIC | Age: 38
End: 2023-02-09
Payer: COMMERCIAL

## 2023-02-13 NOTE — TELEPHONE ENCOUNTER
[employer] message sent   LOV: 9/13/22-virtual for anxiety     Kaia MONIQUE RN   [facility] Triage

## 2023-03-29 ENCOUNTER — TELEPHONE (OUTPATIENT)
Dept: FAMILY MEDICINE | Facility: CLINIC | Age: 38
End: 2023-03-29
Payer: COMMERCIAL

## 2023-03-29 NOTE — TELEPHONE ENCOUNTER
Huddled with MD SANTANA - have pt go to      Called #   Telephone Information:   Mobile [phone]     Advised pt on the information above     Patient stated an understanding and agreed with plan.      Adrianna Bright RN, BSN  [facility]

## 2023-03-29 NOTE — TELEPHONE ENCOUNTER
"2nd level triage    Patient calling.  Went to [facility] urgent care 3/27/23 - cough, facial pain, nasal and chest congestion x 3 wks when she had Covid.  Diagnosed with a sinus infection and given Augmentin (per urgent care report).  No chest xray done per patient.  And strep test was negative.    Since 3/28/23, c/o upper back, neck, and shoulder pain.  Has been coughing but denies any rib pain.  Reports it even hurts to breathe or even talk.    Also states her chest burns when she takes a deep breath x 1 wk but \"has got lot worse since 3/28/23\".  Denies fever.    Asking to be worked into schedule today.  Can come anytime after 11:30a.    Please advise, thanks.  "

## 2023-10-28 ENCOUNTER — HEALTH MAINTENANCE LETTER (OUTPATIENT)
Age: 38
End: 2023-10-28

## 2024-08-03 ENCOUNTER — HEALTH MAINTENANCE LETTER (OUTPATIENT)
Age: 39
End: 2024-08-03

## 2024-12-21 ENCOUNTER — HEALTH MAINTENANCE LETTER (OUTPATIENT)
Age: 39
End: 2024-12-21

## 2025-01-16 ENCOUNTER — NURSE TRIAGE (OUTPATIENT)
Dept: FAMILY MEDICINE | Facility: CLINIC | Age: 40
End: 2025-01-16

## 2025-01-16 NOTE — TELEPHONE ENCOUNTER
S-(situation): patient calls for mono testing     B-(background): has 3 kids in her class with mono.     A-(assessment): patient having body aches, fatigue. Denies sore throat, sob, difficulty swallowing.     R-(recommendations): scheduled appt today    TAY SAMUEL RN on 1/16/2025 at 10:57 AM   [facility]